# Patient Record
Sex: FEMALE | Race: WHITE | Employment: FULL TIME | URBAN - METROPOLITAN AREA
[De-identification: names, ages, dates, MRNs, and addresses within clinical notes are randomized per-mention and may not be internally consistent; named-entity substitution may affect disease eponyms.]

---

## 2017-01-15 ENCOUNTER — PATIENT MESSAGE (OUTPATIENT)
Dept: FAMILY MEDICINE CLINIC | Facility: CLINIC | Age: 62
End: 2017-01-15

## 2017-01-16 RX ORDER — ALENDRONATE SODIUM 70 MG/1
TABLET ORAL
Qty: 12 TABLET | Refills: 1 | Status: SHIPPED | OUTPATIENT
Start: 2017-01-16 | End: 2017-06-17

## 2017-01-16 RX ORDER — ALENDRONATE SODIUM 70 MG/1
TABLET ORAL
Qty: 12 TABLET | Refills: 1 | Status: SHIPPED | OUTPATIENT
Start: 2017-01-16 | End: 2017-01-16

## 2017-01-16 NOTE — TELEPHONE ENCOUNTER
From: Hilario Huffman  To: Zamzam Remy MD  Sent: 1/15/2017 8:39 AM CST  Subject: Prescription Question    Hello,     I am requesting a refill for Alenendronate tabs 70 mg.      Thank you,    Melissa Tovar

## 2017-02-21 RX ORDER — FLUTICASONE PROPIONATE 50 MCG
SPRAY, SUSPENSION (ML) NASAL
Qty: 48 G | Refills: 0 | Status: SHIPPED | OUTPATIENT
Start: 2017-02-21 | End: 2018-12-04

## 2017-03-11 ENCOUNTER — OFFICE VISIT (OUTPATIENT)
Dept: FAMILY MEDICINE CLINIC | Facility: CLINIC | Age: 62
End: 2017-03-11

## 2017-03-11 VITALS
HEART RATE: 79 BPM | DIASTOLIC BLOOD PRESSURE: 58 MMHG | SYSTOLIC BLOOD PRESSURE: 110 MMHG | WEIGHT: 116 LBS | BODY MASS INDEX: 20 KG/M2 | OXYGEN SATURATION: 98 % | TEMPERATURE: 98 F | RESPIRATION RATE: 16 BRPM

## 2017-03-11 DIAGNOSIS — H66.92 LEFT ACUTE OTITIS MEDIA: Primary | ICD-10-CM

## 2017-03-11 PROCEDURE — 99213 OFFICE O/P EST LOW 20 MIN: CPT | Performed by: NURSE PRACTITIONER

## 2017-03-11 RX ORDER — CEFDINIR 300 MG/1
300 CAPSULE ORAL 2 TIMES DAILY
Qty: 20 CAPSULE | Refills: 0 | Status: SHIPPED | OUTPATIENT
Start: 2017-03-11 | End: 2017-03-21

## 2017-03-11 NOTE — PROGRESS NOTES
HPI:   Lizz Gonsalves is a 64year old female who presents with ill symptoms for  6  days. Patient reports congestion, low grade fever, ear pain, sinus pain, OTC cold meds have not been helping. Has tried increasing fluids with not much relief.        Cu Hypertension Mother         Smoking Status: Former Smoker                   Packs/Day: 1.00  Years: 27        Quit date: 11/20/2007    Smokeless Status: Never Used                        Alcohol Use: Yes                Comment: occasionally        REVIEW O swelling as well. · Hand washing-use hand  or wash hands frequently, cover your cough or sneeze, do not share towels or drinks with others. · May use warm pack to ear or face for comfort.  May use Tylenol or Ibuprofen over the counter for pain/c

## 2017-06-19 RX ORDER — ALENDRONATE SODIUM 70 MG/1
TABLET ORAL
Qty: 12 TABLET | Refills: 1 | Status: SHIPPED | OUTPATIENT
Start: 2017-06-19 | End: 2017-11-20

## 2017-10-07 NOTE — PATIENT INSTRUCTIONS
·  PLAN: Cefdinir, take as directed. Finish all the medication even if you feel better. · Probiotics or yogurt daily during antibiotic use will help decrease stomach upset and restore good bacteria to the gut.   · Please start Flonase 1 spray each nostril
right

## 2017-11-21 NOTE — TELEPHONE ENCOUNTER
Medication(s) to Refill:   Pending Prescriptions Disp Refills    ALENDRONATE SODIUM 70 MG Oral Tab [Pharmacy Med Name: ALENDRONATE  70MG  TAB] 12 tablet      Sig: TAKE 1 TABLET BY MOUTH  EVERY WEEK           Last Time Medication was Filled:  6/19/2017

## 2017-11-22 RX ORDER — ALENDRONATE SODIUM 70 MG/1
TABLET ORAL
Qty: 12 TABLET | Refills: 0 | Status: SHIPPED | OUTPATIENT
Start: 2017-11-22 | End: 2018-02-04

## 2017-11-22 NOTE — TELEPHONE ENCOUNTER
Called pt to schedule physical and inform her of need for DEXA. Pt has appt scheduled with Dr. Brice Baires at [facility] on 12/2/17 to possibly establish care due to conflicting office / work hours.  Pt does no wish to completely change her PCP yet, as she wou

## 2017-12-02 ENCOUNTER — OFFICE VISIT (OUTPATIENT)
Dept: FAMILY MEDICINE CLINIC | Facility: CLINIC | Age: 62
End: 2017-12-02

## 2017-12-02 ENCOUNTER — LAB ENCOUNTER (OUTPATIENT)
Dept: LAB | Age: 62
End: 2017-12-02
Attending: FAMILY MEDICINE
Payer: COMMERCIAL

## 2017-12-02 VITALS
HEART RATE: 71 BPM | RESPIRATION RATE: 16 BRPM | DIASTOLIC BLOOD PRESSURE: 78 MMHG | BODY MASS INDEX: 18.61 KG/M2 | OXYGEN SATURATION: 97 % | WEIGHT: 109 LBS | HEIGHT: 64 IN | SYSTOLIC BLOOD PRESSURE: 108 MMHG

## 2017-12-02 DIAGNOSIS — Z00.00 ROUTINE GENERAL MEDICAL EXAMINATION AT A HEALTH CARE FACILITY: ICD-10-CM

## 2017-12-02 DIAGNOSIS — M85.80 OSTEOPENIA, UNSPECIFIED LOCATION: ICD-10-CM

## 2017-12-02 DIAGNOSIS — Z87.891 HISTORY OF SMOKING 30 OR MORE PACK YEARS: ICD-10-CM

## 2017-12-02 DIAGNOSIS — Z13.820 OSTEOPOROSIS SCREENING: ICD-10-CM

## 2017-12-02 DIAGNOSIS — Z12.2 ENCOUNTER FOR SCREENING FOR LUNG CANCER: ICD-10-CM

## 2017-12-02 DIAGNOSIS — Z12.39 BREAST CANCER SCREENING: Primary | ICD-10-CM

## 2017-12-02 PROCEDURE — 82306 VITAMIN D 25 HYDROXY: CPT | Performed by: FAMILY MEDICINE

## 2017-12-02 PROCEDURE — 80061 LIPID PANEL: CPT | Performed by: FAMILY MEDICINE

## 2017-12-02 PROCEDURE — 99396 PREV VISIT EST AGE 40-64: CPT | Performed by: FAMILY MEDICINE

## 2017-12-02 PROCEDURE — 80050 GENERAL HEALTH PANEL: CPT | Performed by: FAMILY MEDICINE

## 2017-12-02 PROCEDURE — 36415 COLL VENOUS BLD VENIPUNCTURE: CPT | Performed by: FAMILY MEDICINE

## 2017-12-02 NOTE — PATIENT INSTRUCTIONS
-- go to lab for bloodwork  -- call insurance about low dose CT scan for lung cancer screening coverage and shingles  -- schedule CT scan, dexa and mammogram  -- we will call results  -- followup as needed

## 2017-12-04 NOTE — PROGRESS NOTES
Anita Mcclain is a 58year old female who is here for Patient presents with:   Well Adult: annual  Other: needs mammogram order and dexa order      HPI:     Here for physical    Screening:  Diet: tries to eat healthy  Exercise: stays active  Sleep: no lb  03/11/17 : 116 lb  12/30/16 : 116 lb  11/26/16 : 112 lb  11/11/15 : 113 lb  06/15/15 : 108 lb      No Known Allergies    Family History   Problem Relation Age of Onset   • Heart Disorder Brother    • Cancer Mother    • Heart Disorder Father    • Heart bilateraly, no c/w/r  CARDIO: RRR without murmurs  GI: soft, non-tender, non-distended, no hsm, bs throughout  NEURO: CN II-XII grossly intact  PSYCH: pleasant  EXTREMITIES: no cyanosis, clubbing or edema  SKIN: no rashes,no suspicious lesions    The ASCVD

## 2017-12-09 ENCOUNTER — HOSPITAL ENCOUNTER (OUTPATIENT)
Dept: CT IMAGING | Facility: HOSPITAL | Age: 62
Discharge: HOME | End: 2017-12-09
Attending: FAMILY MEDICINE
Payer: COMMERCIAL

## 2017-12-09 ENCOUNTER — HOSPITAL ENCOUNTER (OUTPATIENT)
Dept: BONE DENSITY | Age: 62
Discharge: HOME | End: 2017-12-09
Attending: FAMILY MEDICINE
Payer: COMMERCIAL

## 2017-12-09 ENCOUNTER — HOSPITAL ENCOUNTER (OUTPATIENT)
Dept: MAMMOGRAPHY | Age: 62
Discharge: HOME | End: 2017-12-09
Attending: FAMILY MEDICINE
Payer: COMMERCIAL

## 2017-12-09 DIAGNOSIS — Z12.39 BREAST CANCER SCREENING: ICD-10-CM

## 2017-12-09 DIAGNOSIS — Z12.2 ENCOUNTER FOR SCREENING FOR LUNG CANCER: ICD-10-CM

## 2017-12-09 DIAGNOSIS — Z13.820 OSTEOPOROSIS SCREENING: ICD-10-CM

## 2017-12-09 DIAGNOSIS — Z87.891 HISTORY OF SMOKING 30 OR MORE PACK YEARS: ICD-10-CM

## 2017-12-09 DIAGNOSIS — M85.80 OSTEOPENIA, UNSPECIFIED LOCATION: ICD-10-CM

## 2017-12-09 PROCEDURE — 77080 DXA BONE DENSITY AXIAL: CPT | Performed by: FAMILY MEDICINE

## 2017-12-09 PROCEDURE — 77067 SCR MAMMO BI INCL CAD: CPT | Performed by: FAMILY MEDICINE

## 2017-12-14 NOTE — PROGRESS NOTES
Normal labs except  Vit D low - Vit D 50,000 units weekly x 12 wks, then recheck Vit D upon completion  -followup as planned

## 2017-12-15 ENCOUNTER — TELEPHONE (OUTPATIENT)
Dept: FAMILY MEDICINE CLINIC | Facility: CLINIC | Age: 62
End: 2017-12-15

## 2017-12-15 DIAGNOSIS — E55.9 VITAMIN D DEFICIENCY: Primary | ICD-10-CM

## 2017-12-15 RX ORDER — ERGOCALCIFEROL 1.25 MG/1
CAPSULE ORAL
Qty: 13 CAPSULE | Refills: 0 | OUTPATIENT
Start: 2017-12-15

## 2017-12-15 RX ORDER — ERGOCALCIFEROL 1.25 MG/1
50000 CAPSULE ORAL WEEKLY
Qty: 12 CAPSULE | Refills: 0 | Status: SHIPPED | OUTPATIENT
Start: 2017-12-15 | End: 2018-03-15

## 2017-12-15 NOTE — TELEPHONE ENCOUNTER
----- Message from Serena Gomes MD sent at 12/14/2017  3:35 PM CST -----  Normal labs except  Vit D low - Vit D 50,000 units weekly x 12 wks, then recheck Vit D upon completion  -followup as planned

## 2017-12-15 NOTE — TELEPHONE ENCOUNTER
The patient was informed of her test results as well as Dr. Justine Lai recommendations. Vitamin D prescription was electronically sent to the patient's preferred pharmacy and future vitamin D lab was ordered.  Patient verbalized understanding and has no furthe

## 2018-01-25 ENCOUNTER — APPOINTMENT (OUTPATIENT)
Dept: GENERAL RADIOLOGY | Age: 63
End: 2018-01-25
Payer: COMMERCIAL

## 2018-01-25 ENCOUNTER — OFFICE VISIT (OUTPATIENT)
Dept: FAMILY MEDICINE CLINIC | Facility: CLINIC | Age: 63
End: 2018-01-25

## 2018-01-25 ENCOUNTER — HOSPITAL ENCOUNTER (EMERGENCY)
Age: 63
Discharge: HOME | End: 2018-01-25
Attending: EMERGENCY MEDICINE
Payer: COMMERCIAL

## 2018-01-25 VITALS
TEMPERATURE: 98 F | BODY MASS INDEX: 19 KG/M2 | WEIGHT: 108 LBS | SYSTOLIC BLOOD PRESSURE: 110 MMHG | DIASTOLIC BLOOD PRESSURE: 60 MMHG | OXYGEN SATURATION: 98 % | HEART RATE: 89 BPM | RESPIRATION RATE: 18 BRPM

## 2018-01-25 VITALS
SYSTOLIC BLOOD PRESSURE: 137 MMHG | HEART RATE: 81 BPM | OXYGEN SATURATION: 96 % | HEIGHT: 63 IN | BODY MASS INDEX: 19.14 KG/M2 | DIASTOLIC BLOOD PRESSURE: 79 MMHG | RESPIRATION RATE: 14 BRPM | WEIGHT: 108 LBS | TEMPERATURE: 99 F

## 2018-01-25 DIAGNOSIS — Z02.9 ENCOUNTERS FOR ADMINISTRATIVE PURPOSES: Primary | ICD-10-CM

## 2018-01-25 DIAGNOSIS — R07.89 ACUTE CHEST WALL PAIN: Primary | ICD-10-CM

## 2018-01-25 DIAGNOSIS — J11.1 INFLUENZA: ICD-10-CM

## 2018-01-25 DIAGNOSIS — J98.01 ACUTE BRONCHOSPASM: ICD-10-CM

## 2018-01-25 LAB
ALBUMIN SERPL-MCNC: 3.5 G/DL (ref 3.5–4.8)
ALP LIVER SERPL-CCNC: 54 U/L (ref 50–130)
ALT SERPL-CCNC: 19 U/L (ref 14–54)
AST SERPL-CCNC: 22 U/L (ref 15–41)
ATRIAL RATE: 90 BPM
BASOPHILS # BLD AUTO: 0.02 X10(3) UL (ref 0–0.1)
BASOPHILS NFR BLD AUTO: 0.4 %
BILIRUB SERPL-MCNC: 0.4 MG/DL (ref 0.1–2)
BUN BLD-MCNC: 17 MG/DL (ref 8–20)
CALCIUM BLD-MCNC: 8.2 MG/DL (ref 8.3–10.3)
CHLORIDE: 104 MMOL/L (ref 101–111)
CO2: 27 MMOL/L (ref 22–32)
CREAT BLD-MCNC: 0.74 MG/DL (ref 0.55–1.02)
EOSINOPHIL # BLD AUTO: 0.03 X10(3) UL (ref 0–0.3)
EOSINOPHIL NFR BLD AUTO: 0.6 %
ERYTHROCYTE [DISTWIDTH] IN BLOOD BY AUTOMATED COUNT: 12.5 % (ref 11.5–16)
GLUCOSE BLD-MCNC: 86 MG/DL (ref 70–99)
HCT VFR BLD AUTO: 39.5 % (ref 34–50)
HGB BLD-MCNC: 13.1 G/DL (ref 12–16)
IMMATURE GRANULOCYTE COUNT: 0.01 X10(3) UL (ref 0–1)
IMMATURE GRANULOCYTE RATIO %: 0.2 %
LYMPHOCYTES # BLD AUTO: 0.7 X10(3) UL (ref 0.9–4)
LYMPHOCYTES NFR BLD AUTO: 13.5 %
M PROTEIN MFR SERPL ELPH: 7.2 G/DL (ref 6.1–8.3)
MCH RBC QN AUTO: 30.6 PG (ref 27–33.2)
MCHC RBC AUTO-ENTMCNC: 33.2 G/DL (ref 31–37)
MCV RBC AUTO: 92.3 FL (ref 81–100)
MONOCYTES # BLD AUTO: 0.75 X10(3) UL (ref 0.1–0.6)
MONOCYTES NFR BLD AUTO: 14.5 %
NEUTROPHIL ABS PRELIM: 3.68 X10 (3) UL (ref 1.3–6.7)
NEUTROPHILS # BLD AUTO: 3.68 X10(3) UL (ref 1.3–6.7)
NEUTROPHILS NFR BLD AUTO: 70.8 %
P AXIS: 83 DEGREES
P-R INTERVAL: 148 MS
PLATELET # BLD AUTO: 163 10(3)UL (ref 150–450)
POTASSIUM SERPL-SCNC: 3.4 MMOL/L (ref 3.6–5.1)
Q-T INTERVAL: 370 MS
QRS DURATION: 82 MS
QTC CALCULATION (BEZET): 452 MS
R AXIS: 79 DEGREES
RBC # BLD AUTO: 4.28 X10(6)UL (ref 3.8–5.1)
RED CELL DISTRIBUTION WIDTH-SD: 42.4 FL (ref 35.1–46.3)
SODIUM SERPL-SCNC: 137 MMOL/L (ref 136–144)
T AXIS: 58 DEGREES
TROPONIN: <0.046 NG/ML (ref ?–0.05)
VENTRICULAR RATE: 90 BPM
WBC # BLD AUTO: 5.2 X10(3) UL (ref 4–13)

## 2018-01-25 PROCEDURE — 94640 AIRWAY INHALATION TREATMENT: CPT

## 2018-01-25 PROCEDURE — 84484 ASSAY OF TROPONIN QUANT: CPT

## 2018-01-25 PROCEDURE — 80053 COMPREHEN METABOLIC PANEL: CPT

## 2018-01-25 PROCEDURE — 99285 EMERGENCY DEPT VISIT HI MDM: CPT

## 2018-01-25 PROCEDURE — 93005 ELECTROCARDIOGRAM TRACING: CPT

## 2018-01-25 PROCEDURE — 93010 ELECTROCARDIOGRAM REPORT: CPT

## 2018-01-25 PROCEDURE — 94664 DEMO&/EVAL PT USE INHALER: CPT

## 2018-01-25 PROCEDURE — 96374 THER/PROPH/DIAG INJ IV PUSH: CPT

## 2018-01-25 PROCEDURE — 84484 ASSAY OF TROPONIN QUANT: CPT | Performed by: EMERGENCY MEDICINE

## 2018-01-25 PROCEDURE — 85025 COMPLETE CBC W/AUTO DIFF WBC: CPT | Performed by: EMERGENCY MEDICINE

## 2018-01-25 PROCEDURE — 85025 COMPLETE CBC W/AUTO DIFF WBC: CPT

## 2018-01-25 PROCEDURE — 71046 X-RAY EXAM CHEST 2 VIEWS: CPT

## 2018-01-25 PROCEDURE — 80053 COMPREHEN METABOLIC PANEL: CPT | Performed by: EMERGENCY MEDICINE

## 2018-01-25 RX ORDER — OSELTAMIVIR PHOSPHATE 75 MG/1
75 CAPSULE ORAL 2 TIMES DAILY
Qty: 10 CAPSULE | Refills: 0 | Status: SHIPPED | OUTPATIENT
Start: 2018-01-25 | End: 2018-01-30

## 2018-01-25 RX ORDER — NAPROXEN 500 MG/1
500 TABLET ORAL 2 TIMES DAILY PRN
Qty: 20 TABLET | Refills: 0 | Status: SHIPPED | OUTPATIENT
Start: 2018-01-25 | End: 2019-12-11

## 2018-01-25 RX ORDER — PREDNISONE 20 MG/1
20 TABLET ORAL 2 TIMES DAILY
Qty: 10 TABLET | Refills: 0 | Status: SHIPPED | OUTPATIENT
Start: 2018-01-25 | End: 2018-01-30

## 2018-01-25 RX ORDER — KETOROLAC TROMETHAMINE 30 MG/ML
15 INJECTION, SOLUTION INTRAMUSCULAR; INTRAVENOUS ONCE
Status: COMPLETED | OUTPATIENT
Start: 2018-01-25 | End: 2018-01-25

## 2018-01-25 RX ORDER — PREDNISONE 20 MG/1
40 TABLET ORAL ONCE
Status: COMPLETED | OUTPATIENT
Start: 2018-01-25 | End: 2018-01-25

## 2018-01-25 RX ORDER — ALBUTEROL SULFATE 90 UG/1
2 AEROSOL, METERED RESPIRATORY (INHALATION) EVERY 4 HOURS PRN
Qty: 1 INHALER | Refills: 0 | Status: SHIPPED | OUTPATIENT
Start: 2018-01-25 | End: 2018-02-24

## 2018-01-25 RX ORDER — IPRATROPIUM BROMIDE AND ALBUTEROL SULFATE 2.5; .5 MG/3ML; MG/3ML
3 SOLUTION RESPIRATORY (INHALATION) ONCE
Status: COMPLETED | OUTPATIENT
Start: 2018-01-25 | End: 2018-01-25

## 2018-01-25 NOTE — ED PROVIDER NOTES
Patient Seen in: [facility] Emergency Department In [place]    History   Patient presents with:  Chest Pain Angina (cardiovascular)  Dyspnea MARY SOB (respiratory)    Stated Complaint: chest pain onset last night into back, cough since Monday    HPI    62- SURGICAL HISTORY      Comment: left carpal tunnel  1996: OTHER SURGICAL HISTORY      Comment: ganglion cyst right wrist  2003: OTHER SURGICAL HISTORY      Comment: lithotrypsy  No date: OTHER SURGICAL HISTORY      Comment: vein surgery   2016: OTHER SURGIC Abdominal: Soft. Bowel sounds are normal. There is no tenderness. There is no rebound. Musculoskeletal: Normal range of motion. She exhibits no edema or tenderness. Lymphadenopathy:     She has no cervical adenopathy.    Neurological: She is alert and She was given a DuoNeb and this seemed to improve her breathing and her cough. She still has some chest wall pain that she states is worse when she coughs and moves certain ways. She was given a dose of Toradol and was also given a limited prednisone.   I

## 2018-01-25 NOTE — ED INITIAL ASSESSMENT (HPI)
Cough and fever started Monday. Last night started to have constant chest pain-sharp that radiates to the back. No SOB. Increased pain on movement and coughing.

## 2018-01-25 NOTE — PROGRESS NOTES
S:  Patient reports to clinic for flu like sxs over the past few days with cough and tmax of 101. Patient most concerned about her chest pain that radiates to her back. Chest pain was 10/10 last night. Today chest pain is more of a dull ache.   Chest devon

## 2018-02-05 NOTE — TELEPHONE ENCOUNTER
Medication(s) to Refill:   Pending Prescriptions Disp Refills    ALENDRONATE SODIUM 70 MG Oral Tab [Pharmacy Med Name: ALENDRONATE  70MG  TAB] 12 tablet      Sig: TAKE 1 TABLET BY MOUTH  EVERY WEEK       NEW PCP

## 2018-02-06 RX ORDER — ALENDRONATE SODIUM 70 MG/1
TABLET ORAL
Qty: 12 TABLET | Refills: 1 | Status: SHIPPED | OUTPATIENT
Start: 2018-02-06 | End: 2018-07-18

## 2018-03-15 ENCOUNTER — LAB ENCOUNTER (OUTPATIENT)
Dept: LAB | Age: 63
End: 2018-03-15
Attending: FAMILY MEDICINE
Payer: COMMERCIAL

## 2018-03-15 DIAGNOSIS — E55.9 VITAMIN D DEFICIENCY: ICD-10-CM

## 2018-03-15 LAB — 25-HYDROXYVITAMIN D (TOTAL): 38.8 NG/ML (ref 30–100)

## 2018-03-15 PROCEDURE — 36415 COLL VENOUS BLD VENIPUNCTURE: CPT | Performed by: FAMILY MEDICINE

## 2018-03-15 PROCEDURE — 82306 VITAMIN D 25 HYDROXY: CPT | Performed by: FAMILY MEDICINE

## 2018-05-16 ENCOUNTER — OFFICE VISIT (OUTPATIENT)
Dept: FAMILY MEDICINE CLINIC | Facility: CLINIC | Age: 63
End: 2018-05-16

## 2018-05-16 VITALS
BODY MASS INDEX: 18.67 KG/M2 | DIASTOLIC BLOOD PRESSURE: 68 MMHG | TEMPERATURE: 99 F | SYSTOLIC BLOOD PRESSURE: 112 MMHG | HEART RATE: 82 BPM | WEIGHT: 108 LBS | HEIGHT: 63.78 IN

## 2018-05-16 DIAGNOSIS — R93.89 ABNORMAL CHEST X-RAY: ICD-10-CM

## 2018-05-16 DIAGNOSIS — R05.9 COUGH: Primary | ICD-10-CM

## 2018-05-16 PROCEDURE — 99214 OFFICE O/P EST MOD 30 MIN: CPT | Performed by: FAMILY MEDICINE

## 2018-05-16 NOTE — PROGRESS NOTES
CC:  Lizz Gonsalves is a 58year old female here for Patient presents with:  Cough: on & off since April   Test Results: Lung ct      HPI:     URI  -started 4 wks ago with bronchitis  -associated with cough, congestion  -previous treatment: albuterol fr date: OTHER SURGICAL HISTORY      Comment: bilateral foot sx  2009: OTHER SURGICAL HISTORY      Comment: left carpal tunnel  1996: OTHER SURGICAL HISTORY      Comment: ganglion cyst right wrist  2003: OTHER SURGICAL HISTORY      Comment: lithotrypsy  No da

## 2018-05-16 NOTE — PATIENT INSTRUCTIONS
-- conitnue to monitor cough - as long as continues to be better, nothing to worry about  -- call insurance about coverage for Shingrix vaccine - if covered and interested, can call to make nursing visit  -- followup in 6 months, sooner if needed

## 2018-07-17 ENCOUNTER — HOSPITAL ENCOUNTER (EMERGENCY)
Facility: HOSPITAL | Age: 63
Discharge: HOME | End: 2018-07-17
Attending: EMERGENCY MEDICINE
Payer: COMMERCIAL

## 2018-07-17 ENCOUNTER — APPOINTMENT (OUTPATIENT)
Dept: CT IMAGING | Facility: HOSPITAL | Age: 63
End: 2018-07-17
Attending: EMERGENCY MEDICINE
Payer: COMMERCIAL

## 2018-07-17 VITALS
TEMPERATURE: 97 F | WEIGHT: 109 LBS | OXYGEN SATURATION: 100 % | SYSTOLIC BLOOD PRESSURE: 125 MMHG | DIASTOLIC BLOOD PRESSURE: 92 MMHG | HEART RATE: 62 BPM | HEIGHT: 64 IN | RESPIRATION RATE: 18 BRPM | BODY MASS INDEX: 18.61 KG/M2

## 2018-07-17 DIAGNOSIS — R42 DIZZINESS: Primary | ICD-10-CM

## 2018-07-17 LAB
ALBUMIN SERPL-MCNC: 4 G/DL (ref 3.5–4.8)
ALP LIVER SERPL-CCNC: 58 U/L (ref 50–130)
ALT SERPL-CCNC: 24 U/L (ref 14–54)
APTT PPP: 24.9 SECONDS (ref 26.1–34.6)
AST SERPL-CCNC: 24 U/L (ref 15–41)
ATRIAL RATE: 63 BPM
BASOPHILS # BLD AUTO: 0.02 X10(3) UL (ref 0–0.1)
BASOPHILS NFR BLD AUTO: 0.4 %
BILIRUB SERPL-MCNC: 0.5 MG/DL (ref 0.1–2)
BILIRUB UR QL STRIP.AUTO: NEGATIVE
BUN BLD-MCNC: 21 MG/DL (ref 8–20)
CALCIUM BLD-MCNC: 9.1 MG/DL (ref 8.3–10.3)
CHLORIDE: 105 MMOL/L (ref 101–111)
CLARITY UR REFRACT.AUTO: CLEAR
CO2: 27 MMOL/L (ref 22–32)
COLOR UR AUTO: COLORLESS
CREAT BLD-MCNC: 0.81 MG/DL (ref 0.55–1.02)
EOSINOPHIL # BLD AUTO: 0.07 X10(3) UL (ref 0–0.3)
EOSINOPHIL NFR BLD AUTO: 1.4 %
ERYTHROCYTE [DISTWIDTH] IN BLOOD BY AUTOMATED COUNT: 13 % (ref 11.5–16)
GLUCOSE BLD-MCNC: 96 MG/DL (ref 70–99)
GLUCOSE UR STRIP.AUTO-MCNC: NEGATIVE MG/DL
HCT VFR BLD AUTO: 42.9 % (ref 34–50)
HGB BLD-MCNC: 14.1 G/DL (ref 12–16)
IMMATURE GRANULOCYTE COUNT: 0 X10(3) UL (ref 0–1)
IMMATURE GRANULOCYTE RATIO %: 0 %
INR BLD: 0.96 (ref 0.9–1.1)
KETONES UR STRIP.AUTO-MCNC: NEGATIVE MG/DL
LEUKOCYTE ESTERASE UR QL STRIP.AUTO: NEGATIVE
LYMPHOCYTES # BLD AUTO: 1.04 X10(3) UL (ref 0.9–4)
LYMPHOCYTES NFR BLD AUTO: 20.3 %
M PROTEIN MFR SERPL ELPH: 7.6 G/DL (ref 6.1–8.3)
MCH RBC QN AUTO: 30.2 PG (ref 27–33.2)
MCHC RBC AUTO-ENTMCNC: 32.9 G/DL (ref 31–37)
MCV RBC AUTO: 91.9 FL (ref 81–100)
MONOCYTES # BLD AUTO: 0.43 X10(3) UL (ref 0.1–1)
MONOCYTES NFR BLD AUTO: 8.4 %
NEUTROPHIL ABS PRELIM: 3.57 X10 (3) UL (ref 1.3–6.7)
NEUTROPHILS # BLD AUTO: 3.57 X10(3) UL (ref 1.3–6.7)
NEUTROPHILS NFR BLD AUTO: 69.5 %
NITRITE UR QL STRIP.AUTO: NEGATIVE
P AXIS: 84 DEGREES
P-R INTERVAL: 148 MS
PH UR STRIP.AUTO: 5 [PH] (ref 4.5–8)
PLATELET # BLD AUTO: 210 10(3)UL (ref 150–450)
POTASSIUM SERPL-SCNC: 3.6 MMOL/L (ref 3.6–5.1)
PROT UR STRIP.AUTO-MCNC: NEGATIVE MG/DL
PSA SERPL DL<=0.01 NG/ML-MCNC: 13.2 SECONDS (ref 12.4–14.7)
Q-T INTERVAL: 430 MS
QRS DURATION: 82 MS
QTC CALCULATION (BEZET): 440 MS
R AXIS: 60 DEGREES
RBC # BLD AUTO: 4.67 X10(6)UL (ref 3.8–5.1)
RED CELL DISTRIBUTION WIDTH-SD: 43.6 FL (ref 35.1–46.3)
SODIUM SERPL-SCNC: 140 MMOL/L (ref 136–144)
SP GR UR STRIP.AUTO: <1.005 (ref 1–1.03)
T AXIS: 48 DEGREES
TROPONIN: <0.046 NG/ML (ref ?–0.05)
UROBILINOGEN UR STRIP.AUTO-MCNC: <2 MG/DL
VENTRICULAR RATE: 63 BPM
WBC # BLD AUTO: 5.1 X10(3) UL (ref 4–13)

## 2018-07-17 PROCEDURE — 81001 URINALYSIS AUTO W/SCOPE: CPT | Performed by: EMERGENCY MEDICINE

## 2018-07-17 PROCEDURE — 85025 COMPLETE CBC W/AUTO DIFF WBC: CPT | Performed by: EMERGENCY MEDICINE

## 2018-07-17 PROCEDURE — 85610 PROTHROMBIN TIME: CPT | Performed by: EMERGENCY MEDICINE

## 2018-07-17 PROCEDURE — 85730 THROMBOPLASTIN TIME PARTIAL: CPT | Performed by: EMERGENCY MEDICINE

## 2018-07-17 PROCEDURE — 99285 EMERGENCY DEPT VISIT HI MDM: CPT

## 2018-07-17 PROCEDURE — 70450 CT HEAD/BRAIN W/O DYE: CPT | Performed by: EMERGENCY MEDICINE

## 2018-07-17 PROCEDURE — 80053 COMPREHEN METABOLIC PANEL: CPT | Performed by: EMERGENCY MEDICINE

## 2018-07-17 PROCEDURE — 93010 ELECTROCARDIOGRAM REPORT: CPT

## 2018-07-17 PROCEDURE — 36415 COLL VENOUS BLD VENIPUNCTURE: CPT

## 2018-07-17 PROCEDURE — 84484 ASSAY OF TROPONIN QUANT: CPT | Performed by: EMERGENCY MEDICINE

## 2018-07-17 PROCEDURE — 93005 ELECTROCARDIOGRAM TRACING: CPT

## 2018-07-17 NOTE — ED PROVIDER NOTES
Patient Seen in: [facility] Emergency Department    History   Patient presents with:  Dizziness (neurologic)    Stated Complaint: dizzy, jaw pain     HPI    Shahid is a 41-year-old female, with complaints of dizziness.   She has been noticing for t Yes              Comment: occasionally      Review of Systems    Positive for stated complaint: dizzy, jaw pain   Other systems are as noted in HPI. Constitutional and vital signs reviewed.       All other systems reviewed and negative except as noted abov Normal   CBC WITH DIFFERENTIAL WITH PLATELET    Narrative: The following orders were created for panel order CBC WITH DIFFERENTIAL WITH PLATELET.   Procedure                               Abnormality         Status                     ---------

## 2018-07-17 NOTE — ED INITIAL ASSESSMENT (HPI)
Patient has had Rt jaw and neck pain for couple days. Today is dizzy and shaky.  States she might have a dental issue on Rt side

## 2018-07-18 ENCOUNTER — OFFICE VISIT (OUTPATIENT)
Dept: FAMILY MEDICINE CLINIC | Facility: CLINIC | Age: 63
End: 2018-07-18
Payer: COMMERCIAL

## 2018-07-18 VITALS
DIASTOLIC BLOOD PRESSURE: 82 MMHG | WEIGHT: 111 LBS | HEIGHT: 63.78 IN | HEART RATE: 70 BPM | SYSTOLIC BLOOD PRESSURE: 114 MMHG | BODY MASS INDEX: 19.19 KG/M2

## 2018-07-18 DIAGNOSIS — R68.84 JAW PAIN: ICD-10-CM

## 2018-07-18 DIAGNOSIS — R42 DIZZINESS: Primary | ICD-10-CM

## 2018-07-18 DIAGNOSIS — R51.9 HEADACHE DISORDER: ICD-10-CM

## 2018-07-18 PROCEDURE — 99214 OFFICE O/P EST MOD 30 MIN: CPT | Performed by: FAMILY MEDICINE

## 2018-07-18 NOTE — PROGRESS NOTES
Crystal Brown is a 58year old female here for Patient presents with:  ER F/U: Right side jaw pain, headache, and dizziness follow-up   Other: The patient states that Memorial day weekend she fell off a ladder       HPI:       1. Dizziness  2.  Headache Father       Social History: Smoking status: Former Smoker                                                              Packs/day: 1.00      Years: 30.00        Quit date: 11/20/2007  Smokeless tobacco: Never Used                      Alcohol use:  Yes with dentist for full oral exam  -f/u here in 1 month, sooner prn          The patient is asked to return in 1 month. Orders This Visit:  No orders of the defined types were placed in this encounter.       Meds This Visit:    No prescriptions requested o

## 2018-07-18 NOTE — PATIENT INSTRUCTIONS
-- stop alendronate  -- start flonase every day  -- start generic claritin or zyrtec daily  -- start exercises for upper back and neck daily  -- heat/ice as needed  -- tylenol or ibuprofen as needed  -- if jaw pain not improving, see dentist  -- followup

## 2018-10-03 ENCOUNTER — OFFICE VISIT (OUTPATIENT)
Dept: FAMILY MEDICINE CLINIC | Facility: CLINIC | Age: 63
End: 2018-10-03
Payer: COMMERCIAL

## 2018-10-03 VITALS
HEART RATE: 68 BPM | BODY MASS INDEX: 19.19 KG/M2 | HEIGHT: 63.78 IN | SYSTOLIC BLOOD PRESSURE: 118 MMHG | DIASTOLIC BLOOD PRESSURE: 62 MMHG | WEIGHT: 111 LBS

## 2018-10-03 DIAGNOSIS — R22.2 LUMP IN CHEST: Primary | ICD-10-CM

## 2018-10-03 PROCEDURE — 99214 OFFICE O/P EST MOD 30 MIN: CPT | Performed by: FAMILY MEDICINE

## 2018-10-03 NOTE — PATIENT INSTRUCTIONS
-- warm compress to area 2-3x/day  ----ibuprofen 400mg-600mg 3x/day with food consistently for 2-3 days, then only as needed for pain (do not use for prolonged period)  -- call to schedule ultrasound  -- followup if not improving or worsening  -- come ba

## 2018-10-03 NOTE — PROGRESS NOTES
Hilario Huffman is a 58year old female here for Patient presents with:  Chest Pressure: Bulging vein & bruising in chest since last week. HPI:       1.  Lump in chest  -started last week  -noticed a bruise on right side of chest   - also looks like since quitting: 10.8      Smokeless tobacco: Never Used    Alcohol use: Yes      Comment: occasionally    Drug use: No       Medications (Active prior to today's visit):    Current Outpatient Medications:  naproxen 500 MG Oral Tab Take 1 tablet (500 mg tot MD

## 2018-10-08 ENCOUNTER — HOSPITAL ENCOUNTER (OUTPATIENT)
Dept: ULTRASOUND IMAGING | Facility: HOSPITAL | Age: 63
Discharge: HOME | End: 2018-10-08
Attending: FAMILY MEDICINE
Payer: COMMERCIAL

## 2018-10-08 DIAGNOSIS — R22.2 LUMP IN CHEST: ICD-10-CM

## 2018-10-08 PROCEDURE — 76604 US EXAM CHEST: CPT | Performed by: FAMILY MEDICINE

## 2018-10-19 ENCOUNTER — TELEPHONE (OUTPATIENT)
Dept: FAMILY MEDICINE CLINIC | Facility: CLINIC | Age: 63
End: 2018-10-19

## 2018-10-19 NOTE — TELEPHONE ENCOUNTER
----- Message from Zeeshan Walton MD sent at 10/18/2018  2:34 PM CDT -----  Could be fistula or aneurysm - please refer to Dr. John Feng for further evaluation (dx: av fistula)

## 2018-12-04 ENCOUNTER — OFFICE VISIT (OUTPATIENT)
Dept: FAMILY MEDICINE CLINIC | Facility: CLINIC | Age: 63
End: 2018-12-04
Payer: COMMERCIAL

## 2018-12-04 VITALS
WEIGHT: 110.13 LBS | DIASTOLIC BLOOD PRESSURE: 78 MMHG | SYSTOLIC BLOOD PRESSURE: 112 MMHG | OXYGEN SATURATION: 98 % | HEIGHT: 63.39 IN | HEART RATE: 79 BPM | BODY MASS INDEX: 19.27 KG/M2 | TEMPERATURE: 98 F

## 2018-12-04 DIAGNOSIS — Z12.39 BREAST CANCER SCREENING: ICD-10-CM

## 2018-12-04 DIAGNOSIS — Z12.2 ENCOUNTER FOR SCREENING FOR LUNG CANCER: ICD-10-CM

## 2018-12-04 DIAGNOSIS — Z12.11 COLON CANCER SCREENING: ICD-10-CM

## 2018-12-04 DIAGNOSIS — Z00.00 ROUTINE GENERAL MEDICAL EXAMINATION AT A HEALTH CARE FACILITY: Primary | ICD-10-CM

## 2018-12-04 PROCEDURE — 99396 PREV VISIT EST AGE 40-64: CPT | Performed by: FAMILY MEDICINE

## 2018-12-04 RX ORDER — FLUTICASONE PROPIONATE 50 MCG
SPRAY, SUSPENSION (ML) NASAL
Qty: 48 G | Refills: 0 | Status: SHIPPED | OUTPATIENT
Start: 2018-12-04 | End: 2018-12-04

## 2018-12-04 RX ORDER — FLUTICASONE PROPIONATE 50 MCG
SPRAY, SUSPENSION (ML) NASAL
Qty: 3 BOTTLE | Refills: 0 | Status: SHIPPED | OUTPATIENT
Start: 2018-12-04

## 2018-12-05 NOTE — PROGRESS NOTES
Alvarez Carranza is a 61year old female who is here for No chief complaint on file.       HPI:     Here for physical    Screening:  Diet: tries to eat healthy  Exercise: stays active  Sleep: normal  Depression/Anxiety: mild anxiety - controllable    Las Disorder Father       Past Medical History:   Diagnosis Date   • Allergic rhinitis 2000   • Calculus of kidney 2005   • Cancer ([address]     basal cell cancer   • Hx of skin cancer, basal cell 2016    on right temple   • Osteopenia    • Visual impairment     g cyanosis, clubbing or edema  SKIN: no rashes,no suspicious lesions    The ASCVD Risk score (Scott Edwards., et al., 2013) failed to calculate for the following reasons:     The valid HDL cholesterol range is 20 to 100 mg/dL    ASSESSMENT AND PLAN:     Health M

## 2018-12-05 NOTE — PATIENT INSTRUCTIONS
-- -- come in for fasting bloodwork anytime that you are able to (8-10h no food, only water; lab here is open M-F, 8am-12)  -- go to [email or web address] to schedule an appointment online  -- we will call with results about 5-7 days after bloodwork is completed

## 2018-12-08 ENCOUNTER — LAB ENCOUNTER (OUTPATIENT)
Dept: LAB | Age: 63
End: 2018-12-08
Attending: FAMILY MEDICINE
Payer: COMMERCIAL

## 2018-12-08 DIAGNOSIS — Z00.00 ROUTINE GENERAL MEDICAL EXAMINATION AT A HEALTH CARE FACILITY: ICD-10-CM

## 2018-12-08 PROCEDURE — 80050 GENERAL HEALTH PANEL: CPT | Performed by: FAMILY MEDICINE

## 2018-12-08 PROCEDURE — 36415 COLL VENOUS BLD VENIPUNCTURE: CPT | Performed by: FAMILY MEDICINE

## 2018-12-08 PROCEDURE — 80061 LIPID PANEL: CPT | Performed by: FAMILY MEDICINE

## 2018-12-17 ENCOUNTER — TELEPHONE (OUTPATIENT)
Dept: FAMILY MEDICINE CLINIC | Facility: CLINIC | Age: 63
End: 2018-12-17

## 2018-12-17 DIAGNOSIS — D72.819 LEUKOPENIA, UNSPECIFIED TYPE: Primary | ICD-10-CM

## 2018-12-17 NOTE — TELEPHONE ENCOUNTER
----- Message from Armando Leon MD sent at 12/17/2018  8:07 AM CST -----  Labs normal except: White blood cell (WBC) count in mildly low.   Please order repeat CBC, dx: leukopenia  -patient notified via [employer]

## 2018-12-31 ENCOUNTER — HOSPITAL ENCOUNTER (OUTPATIENT)
Dept: MAMMOGRAPHY | Age: 63
Discharge: HOME | End: 2018-12-31
Attending: FAMILY MEDICINE
Payer: COMMERCIAL

## 2018-12-31 ENCOUNTER — HOSPITAL ENCOUNTER (OUTPATIENT)
Dept: CT IMAGING | Facility: HOSPITAL | Age: 63
Discharge: HOME | End: 2018-12-31
Attending: FAMILY MEDICINE
Payer: COMMERCIAL

## 2018-12-31 DIAGNOSIS — Z00.00 ROUTINE GENERAL MEDICAL EXAMINATION AT A HEALTH CARE FACILITY: ICD-10-CM

## 2018-12-31 DIAGNOSIS — Z12.39 BREAST CANCER SCREENING: ICD-10-CM

## 2018-12-31 DIAGNOSIS — Z12.2 ENCOUNTER FOR SCREENING FOR LUNG CANCER: ICD-10-CM

## 2018-12-31 PROCEDURE — 77067 SCR MAMMO BI INCL CAD: CPT | Performed by: FAMILY MEDICINE

## 2019-01-07 ENCOUNTER — OFFICE VISIT (OUTPATIENT)
Dept: SURGERY | Facility: CLINIC | Age: 64
End: 2019-01-07
Payer: COMMERCIAL

## 2019-01-07 ENCOUNTER — TELEPHONE (OUTPATIENT)
Dept: FAMILY MEDICINE CLINIC | Facility: CLINIC | Age: 64
End: 2019-01-07

## 2019-01-07 VITALS
HEART RATE: 78 BPM | OXYGEN SATURATION: 99 % | WEIGHT: 108 LBS | DIASTOLIC BLOOD PRESSURE: 98 MMHG | BODY MASS INDEX: 18.44 KG/M2 | SYSTOLIC BLOOD PRESSURE: 141 MMHG | RESPIRATION RATE: 18 BRPM | HEIGHT: 64 IN

## 2019-01-07 DIAGNOSIS — I80.8 MONDOR'S DISEASE: Primary | ICD-10-CM

## 2019-01-07 DIAGNOSIS — Z12.2 ENCOUNTER FOR SCREENING FOR LUNG CANCER: Primary | ICD-10-CM

## 2019-01-07 PROCEDURE — 76642 ULTRASOUND BREAST LIMITED: CPT | Performed by: SURGERY

## 2019-01-07 PROCEDURE — 99203 OFFICE O/P NEW LOW 30 MIN: CPT | Performed by: SURGERY

## 2019-01-07 NOTE — TELEPHONE ENCOUNTER
----- Message from Yadira Jean-Baptiste MD sent at 1/7/2019  7:59 AM CST -----  CT scan is unremarkable - recommendation is to repeat in 1 yr - please reorder for 1 yr from order date

## 2019-01-07 NOTE — TELEPHONE ENCOUNTER
Spoke with patient informed of results. She verbalized understanding and had no further questions or concerns at this time. She will get CT scan of Lung in 1 year.

## 2019-01-14 ENCOUNTER — OFFICE VISIT (OUTPATIENT)
Dept: FAMILY MEDICINE CLINIC | Facility: CLINIC | Age: 64
End: 2019-01-14
Payer: COMMERCIAL

## 2019-01-14 VITALS
WEIGHT: 108 LBS | HEIGHT: 64 IN | TEMPERATURE: 98 F | OXYGEN SATURATION: 98 % | DIASTOLIC BLOOD PRESSURE: 84 MMHG | SYSTOLIC BLOOD PRESSURE: 122 MMHG | BODY MASS INDEX: 18.44 KG/M2 | HEART RATE: 78 BPM | RESPIRATION RATE: 16 BRPM

## 2019-01-14 DIAGNOSIS — H66.001 ACUTE SUPPURATIVE OTITIS MEDIA OF RIGHT EAR WITHOUT SPONTANEOUS RUPTURE OF TYMPANIC MEMBRANE, RECURRENCE NOT SPECIFIED: Primary | ICD-10-CM

## 2019-01-14 DIAGNOSIS — J06.9 UPPER RESPIRATORY TRACT INFECTION, UNSPECIFIED TYPE: ICD-10-CM

## 2019-01-14 PROCEDURE — 99213 OFFICE O/P EST LOW 20 MIN: CPT | Performed by: PHYSICIAN ASSISTANT

## 2019-01-14 RX ORDER — AMOXICILLIN 500 MG/1
500 CAPSULE ORAL 3 TIMES DAILY
Qty: 30 CAPSULE | Refills: 0 | Status: SHIPPED | OUTPATIENT
Start: 2019-01-14 | End: 2019-01-24

## 2019-01-14 RX ORDER — FLUTICASONE PROPIONATE 50 MCG
2 SPRAY, SUSPENSION (ML) NASAL DAILY
Qty: 1 BOTTLE | Refills: 0 | Status: SHIPPED | OUTPATIENT
Start: 2019-01-14 | End: 2019-01-28

## 2019-01-14 NOTE — PROGRESS NOTES
CHIEF COMPLAINT:   Patient presents with:  Runny Nose: 3-4 days, R ear fluttering x today      HPI:   Luis Berman is a 61year old female who presents for upper respiratory symptoms for 4 days.  Patient reports mild nasal congestion, nasal drainage, m • COLONOSCOPY N/A 11/22/2013    Performed by Joshua Zapien MD at [facility] ENDOSCOPY   • ALVARADO NEEDLE LOCALIZATION W/ SPECIMEN 1 SITE LEFT  1992 ?      benign   • OTHER SURGICAL HISTORY      bilateral foot sx   • OTHER SURGICAL HISTORY  2009    left carpal valentina THROAT: Oral mucosa pink, moist. Posterior pharynx is not erythematous. No exudates. Uvula midline. NECK: Supple, non-tender  LUNGS: clear to auscultation bilaterally, no wheezes or rhonchi. Breathing is non labored.   CARDIO: RRR without murmur  EXTREMIT · Finish all of the antibiotic medicine given, even though you may feel better after the first few days. · You may use over-the-counter medicine, such as acetaminophen or ibuprofen, to control pain and fever, unless something else was prescribed.  If you h

## 2019-01-14 NOTE — PATIENT INSTRUCTIONS
1. Amoxicillin  2. OTC Flonase  3. Follow up with PCP    Middle Ear Infection (Adult)  You have an infection of the middle ear, the space behind the eardrum. This is also called acute otitis media (AOM). Sometimes it is caused by the common cold.  This is © 4560-1056 [employer] [address] [facility] [place]. All rights reserved. This information is not intended as a substitute for professional medical care. Always follow your healthcare professional's instructions.

## 2019-02-09 ENCOUNTER — LAB ENCOUNTER (OUTPATIENT)
Dept: LAB | Age: 64
End: 2019-02-09
Attending: FAMILY MEDICINE
Payer: COMMERCIAL

## 2019-02-09 DIAGNOSIS — D72.819 LEUKOPENIA, UNSPECIFIED TYPE: ICD-10-CM

## 2019-02-09 LAB
BASOPHILS # BLD AUTO: 0.02 X10(3) UL (ref 0–0.2)
BASOPHILS NFR BLD AUTO: 0.4 %
DEPRECATED RDW RBC AUTO: 46.7 FL (ref 35.1–46.3)
EOSINOPHIL # BLD AUTO: 0.08 X10(3) UL (ref 0–0.7)
EOSINOPHIL NFR BLD AUTO: 1.8 %
ERYTHROCYTE [DISTWIDTH] IN BLOOD BY AUTOMATED COUNT: 13.4 % (ref 11–15)
HCT VFR BLD AUTO: 40.6 % (ref 35–48)
HGB BLD-MCNC: 12.7 G/DL (ref 12–16)
IMM GRANULOCYTES # BLD AUTO: 0 X10(3) UL (ref 0–1)
IMM GRANULOCYTES NFR BLD: 0 %
LYMPHOCYTES # BLD AUTO: 1.03 X10(3) UL (ref 1–4)
LYMPHOCYTES NFR BLD AUTO: 22.9 %
MCH RBC QN AUTO: 29.4 PG (ref 26–34)
MCHC RBC AUTO-ENTMCNC: 31.3 G/DL (ref 31–37)
MCV RBC AUTO: 94 FL (ref 80–100)
MONOCYTES # BLD AUTO: 0.37 X10(3) UL (ref 0.1–1)
MONOCYTES NFR BLD AUTO: 8.2 %
NEUTROPHILS # BLD AUTO: 2.99 X10 (3) UL (ref 1.5–7.7)
NEUTROPHILS # BLD AUTO: 2.99 X10(3) UL (ref 1.5–7.7)
NEUTROPHILS NFR BLD AUTO: 66.7 %
PLATELET # BLD AUTO: 212 10(3)UL (ref 150–450)
RBC # BLD AUTO: 4.32 X10(6)UL (ref 3.8–5.3)
WBC # BLD AUTO: 4.5 X10(3) UL (ref 4–11)

## 2019-02-09 PROCEDURE — 36415 COLL VENOUS BLD VENIPUNCTURE: CPT | Performed by: FAMILY MEDICINE

## 2019-02-09 PROCEDURE — 85025 COMPLETE CBC W/AUTO DIFF WBC: CPT | Performed by: FAMILY MEDICINE

## 2019-05-02 NOTE — PROGRESS NOTES
Breast Surgery New Patient Consultation    This is the first visit for this 61year old woman, referred by Dr. Piper Salas, who presents for evaluation of right breast concer.     History of Present Illness:   Ms. Tato Cordero is a 61year old woman who pre OTHER SURGICAL HISTORY      bilateral foot sx   • OTHER SURGICAL HISTORY  2009    left carpal tunnel   • OTHER SURGICAL HISTORY  1996    ganglion cyst right wrist   • OTHER SURGICAL HISTORY  2003    lithotrypsy   • OTHER SURGICAL HISTORY      vein surgery Never Used   The patient is . She has 1 child. She is employed full-time. Review of Systems:  General:   The patient denies, fever, chills, night sweats, fatigue, generalized weakness, change in appetite or weight loss.     HEENT:     The patie pain or bone pain.     Neuropsychiatric:  There is no history of migraines or severe headaches, seizure/epilepsy, speech problems, coordination problems, trembling/tremors, fainting/black outs, dizziness, memory problems, loss of sensation/numbness, problem areola appear normal. There is no skin dimpling with movement of the pectoralis. There is no nipple retraction. No nipple discharge can be elicited. The parenchyma is mildly nodular.  There are no dominant masses in the breast. The axillary tail is normal. further interrogation and/or intervention is required at this time. Provided her symptoms remain resolved, she will not benefit from any screening until December 2018. She was encouraged to contact my office should her dilation and/or symptoms return.  Sh

## 2019-05-22 ENCOUNTER — MED REC SCAN ONLY (OUTPATIENT)
Dept: FAMILY MEDICINE CLINIC | Facility: CLINIC | Age: 64
End: 2019-05-22

## 2019-08-23 ENCOUNTER — HOSPITAL ENCOUNTER (OUTPATIENT)
Facility: HOSPITAL | Age: 64
Setting detail: HOSPITAL OUTPATIENT SURGERY
Discharge: HOME | End: 2019-08-23
Attending: ORTHOPAEDIC SURGERY | Admitting: ORTHOPAEDIC SURGERY
Payer: OTHER MISCELLANEOUS

## 2019-08-23 VITALS
HEIGHT: 64 IN | RESPIRATION RATE: 16 BRPM | SYSTOLIC BLOOD PRESSURE: 152 MMHG | DIASTOLIC BLOOD PRESSURE: 89 MMHG | OXYGEN SATURATION: 100 % | HEART RATE: 59 BPM | BODY MASS INDEX: 19.23 KG/M2 | TEMPERATURE: 98 F | WEIGHT: 112.63 LBS

## 2019-08-23 PROCEDURE — 01N50ZZ RELEASE MEDIAN NERVE, OPEN APPROACH: ICD-10-PCS | Performed by: ORTHOPAEDIC SURGERY

## 2019-08-23 RX ORDER — HYDROCODONE BITARTRATE AND ACETAMINOPHEN 5; 325 MG/1; MG/1
2 TABLET ORAL AS NEEDED
Status: DISCONTINUED | OUTPATIENT
Start: 2019-08-23 | End: 2019-08-23

## 2019-08-23 RX ORDER — HYDROCODONE BITARTRATE AND ACETAMINOPHEN 5; 325 MG/1; MG/1
1 TABLET ORAL AS NEEDED
Status: DISCONTINUED | OUTPATIENT
Start: 2019-08-23 | End: 2019-08-23

## 2019-08-23 RX ORDER — ACETAMINOPHEN AND CODEINE PHOSPHATE 300; 30 MG/1; MG/1
1-2 TABLET ORAL EVERY 6 HOURS PRN
Qty: 20 TABLET | Refills: 0 | Status: SHIPPED | OUTPATIENT
Start: 2019-08-23 | End: 2019-12-11 | Stop reason: ALTCHOICE

## 2019-08-23 RX ORDER — ONDANSETRON 2 MG/ML
4 INJECTION INTRAMUSCULAR; INTRAVENOUS AS NEEDED
Status: DISCONTINUED | OUTPATIENT
Start: 2019-08-23 | End: 2019-08-23

## 2019-08-23 RX ORDER — SODIUM CHLORIDE, SODIUM LACTATE, POTASSIUM CHLORIDE, CALCIUM CHLORIDE 600; 310; 30; 20 MG/100ML; MG/100ML; MG/100ML; MG/100ML
INJECTION, SOLUTION INTRAVENOUS CONTINUOUS
Status: DISCONTINUED | OUTPATIENT
Start: 2019-08-23 | End: 2019-08-23

## 2019-08-23 RX ORDER — DEXAMETHASONE SODIUM PHOSPHATE 4 MG/ML
4 VIAL (ML) INJECTION AS NEEDED
Status: DISCONTINUED | OUTPATIENT
Start: 2019-08-23 | End: 2019-08-23

## 2019-08-23 RX ORDER — HYDROMORPHONE HYDROCHLORIDE 1 MG/ML
0.4 INJECTION, SOLUTION INTRAMUSCULAR; INTRAVENOUS; SUBCUTANEOUS EVERY 5 MIN PRN
Status: DISCONTINUED | OUTPATIENT
Start: 2019-08-23 | End: 2019-08-23

## 2019-08-23 RX ORDER — CEFAZOLIN SODIUM/WATER 2 G/20 ML
2 SYRINGE (ML) INTRAVENOUS ONCE
Status: COMPLETED | OUTPATIENT
Start: 2019-08-23 | End: 2019-08-23

## 2019-08-23 RX ORDER — NALOXONE HYDROCHLORIDE 0.4 MG/ML
80 INJECTION, SOLUTION INTRAMUSCULAR; INTRAVENOUS; SUBCUTANEOUS AS NEEDED
Status: DISCONTINUED | OUTPATIENT
Start: 2019-08-23 | End: 2019-08-23

## 2019-08-23 RX ORDER — ACETAMINOPHEN 500 MG
1000 TABLET ORAL ONCE
Status: DISCONTINUED | OUTPATIENT
Start: 2019-08-23 | End: 2019-08-23 | Stop reason: HOSPADM

## 2019-08-23 NOTE — BRIEF OP NOTE
Pre-Operative Diagnosis: CARPAL TUNNEL SYNDROME, RIGHT     Post-Operative Diagnosis: CARPAL TUNNEL SYNDROME, RIGHT      Procedure Performed:   Procedure(s):  OPEN CARPAL TUNNEL RELEASE RIGHT    Surgeon(s) and Role:     Rell Pablo MD - [facility]

## 2019-08-23 NOTE — OPERATIVE REPORT
OPERATIVE PROCEDURE                  Patient Name: Vannessa Gonzalez    Preoperative Diagnosis: CARPAL TUNNEL SYNDROME, RIGHT    Postoperative Diagnosis: CARPAL TUNNEL SYNDROME, RIGHT    Primary Surgeon: Rani Fernandes     Assistant: none    Procedures: RIGHT upper extremity extended on a hand table. The patient was then placed in a well-padded proximal double-arm tourniquet preset to 250 mmHg. Anesthesia then administered a regional [place] block without complication.  The operative upper extremity was then carefu in slight extension. An Ace wrap was used for compression. The patient tolerated the procedure well and was taken to the postanesthesia phase II recovery room in stable condition. All sponge, needle, and instrument counts were correct.        Michelle Elias

## 2019-12-11 ENCOUNTER — OFFICE VISIT (OUTPATIENT)
Dept: FAMILY MEDICINE CLINIC | Facility: CLINIC | Age: 64
End: 2019-12-11
Payer: COMMERCIAL

## 2019-12-11 VITALS
DIASTOLIC BLOOD PRESSURE: 82 MMHG | OXYGEN SATURATION: 94 % | SYSTOLIC BLOOD PRESSURE: 128 MMHG | HEART RATE: 80 BPM | BODY MASS INDEX: 20.47 KG/M2 | TEMPERATURE: 98 F | WEIGHT: 117 LBS | HEIGHT: 63.39 IN

## 2019-12-11 DIAGNOSIS — Z12.4 SCREENING FOR MALIGNANT NEOPLASM OF CERVIX: ICD-10-CM

## 2019-12-11 DIAGNOSIS — Z00.00 ROUTINE GENERAL MEDICAL EXAMINATION AT A HEALTH CARE FACILITY: Primary | ICD-10-CM

## 2019-12-11 DIAGNOSIS — Z12.39 BREAST CANCER SCREENING: ICD-10-CM

## 2019-12-11 DIAGNOSIS — Z12.2 ENCOUNTER FOR SCREENING FOR LUNG CANCER: ICD-10-CM

## 2019-12-11 PROCEDURE — 99396 PREV VISIT EST AGE 40-64: CPT | Performed by: FAMILY MEDICINE

## 2019-12-11 RX ORDER — AMOXICILLIN AND CLAVULANATE POTASSIUM 875; 125 MG/1; MG/1
TABLET, FILM COATED ORAL
Refills: 0 | COMMUNITY
Start: 2019-12-07 | End: 2021-01-18 | Stop reason: ALTCHOICE

## 2019-12-12 NOTE — PROGRESS NOTES
Kristina Kunz is a 59year old female who is here for No chief complaint on file.       HPI:     Here for physical    Screening:  Diet: tries to eat healthy  Exercise: stays active  Sleep: normal  Depression/Anxiety: mild anxiety - controllable    Las Encounters:  12/11/19 : 117 lb (53.1 kg)  08/23/19 : 112 lb 10.5 oz (51.1 kg)  01/16/19 : 108 lb (49 kg)  01/14/19 : 108 lb (49 kg)  01/07/19 : 108 lb (49 kg)  12/26/18 : 108 lb (49 kg)      No Known Allergies    Family History   Problem Relation Age of On Years since quittin.0      Smokeless tobacco: Never Used    Alcohol use: Yes      Frequency: 2-3 times a week      Drinks per session: 1 or 2      Comment: 1-2 DRINKS WEEK    Drug use: No          EXAM:   /82 (BP Location: Left arm, Patient Posi

## 2019-12-12 NOTE — PATIENT INSTRUCTIONS
-- come in for fasting bloodwork anytime that you are able to (8-10h no food, only water; lab here is open M-F, 8am-12)  -- go to [email or web address] to schedule an appointment online  -- we will call with results about 5-7 days after bloodwork is completed

## 2020-01-02 ENCOUNTER — LAB ENCOUNTER (OUTPATIENT)
Dept: LAB | Age: 65
End: 2020-01-02
Attending: FAMILY MEDICINE
Payer: COMMERCIAL

## 2020-01-02 ENCOUNTER — HOSPITAL ENCOUNTER (OUTPATIENT)
Dept: CT IMAGING | Facility: HOSPITAL | Age: 65
Discharge: HOME | End: 2020-01-02
Attending: FAMILY MEDICINE
Payer: COMMERCIAL

## 2020-01-02 ENCOUNTER — HOSPITAL ENCOUNTER (OUTPATIENT)
Dept: MAMMOGRAPHY | Age: 65
Discharge: HOME | End: 2020-01-02
Attending: FAMILY MEDICINE
Payer: COMMERCIAL

## 2020-01-02 DIAGNOSIS — Z00.00 ROUTINE GENERAL MEDICAL EXAMINATION AT A HEALTH CARE FACILITY: ICD-10-CM

## 2020-01-02 DIAGNOSIS — Z12.2 ENCOUNTER FOR SCREENING FOR LUNG CANCER: ICD-10-CM

## 2020-01-02 DIAGNOSIS — Z12.39 BREAST CANCER SCREENING: ICD-10-CM

## 2020-01-02 LAB
ALBUMIN SERPL-MCNC: 3.7 G/DL (ref 3.4–5)
ALBUMIN/GLOB SERPL: 1.2 {RATIO} (ref 1–2)
ALP LIVER SERPL-CCNC: 60 U/L (ref 50–130)
ALT SERPL-CCNC: 16 U/L (ref 13–56)
ANION GAP SERPL CALC-SCNC: 4 MMOL/L (ref 0–18)
AST SERPL-CCNC: 18 U/L (ref 15–37)
BASOPHILS # BLD AUTO: 0.02 X10(3) UL (ref 0–0.2)
BASOPHILS NFR BLD AUTO: 0.5 %
BILIRUB SERPL-MCNC: 0.5 MG/DL (ref 0.1–2)
BUN BLD-MCNC: 21 MG/DL (ref 7–18)
BUN/CREAT SERPL: 29.2 (ref 10–20)
CALCIUM BLD-MCNC: 8.9 MG/DL (ref 8.5–10.1)
CHLORIDE SERPL-SCNC: 111 MMOL/L (ref 98–112)
CHOLEST SMN-MCNC: 233 MG/DL (ref ?–200)
CO2 SERPL-SCNC: 28 MMOL/L (ref 21–32)
CREAT BLD-MCNC: 0.72 MG/DL (ref 0.55–1.02)
DEPRECATED RDW RBC AUTO: 43.2 FL (ref 35.1–46.3)
EOSINOPHIL # BLD AUTO: 0.13 X10(3) UL (ref 0–0.7)
EOSINOPHIL NFR BLD AUTO: 2.9 %
ERYTHROCYTE [DISTWIDTH] IN BLOOD BY AUTOMATED COUNT: 12.4 % (ref 11–15)
GLOBULIN PLAS-MCNC: 3.1 G/DL (ref 2.8–4.4)
GLUCOSE BLD-MCNC: 83 MG/DL (ref 70–99)
HCT VFR BLD AUTO: 42.9 % (ref 35–48)
HDLC SERPL-MCNC: 101 MG/DL (ref 40–59)
HGB BLD-MCNC: 13.7 G/DL (ref 12–16)
IMM GRANULOCYTES # BLD AUTO: 0.01 X10(3) UL (ref 0–1)
IMM GRANULOCYTES NFR BLD: 0.2 %
LDLC SERPL CALC-MCNC: 115 MG/DL (ref ?–100)
LYMPHOCYTES # BLD AUTO: 1.16 X10(3) UL (ref 1–4)
LYMPHOCYTES NFR BLD AUTO: 26.3 %
M PROTEIN MFR SERPL ELPH: 6.8 G/DL (ref 6.4–8.2)
MCH RBC QN AUTO: 30.4 PG (ref 26–34)
MCHC RBC AUTO-ENTMCNC: 31.9 G/DL (ref 31–37)
MCV RBC AUTO: 95.1 FL (ref 80–100)
MONOCYTES # BLD AUTO: 0.42 X10(3) UL (ref 0.1–1)
MONOCYTES NFR BLD AUTO: 9.5 %
NEUTROPHILS # BLD AUTO: 2.67 X10 (3) UL (ref 1.5–7.7)
NEUTROPHILS # BLD AUTO: 2.67 X10(3) UL (ref 1.5–7.7)
NEUTROPHILS NFR BLD AUTO: 60.6 %
NONHDLC SERPL-MCNC: 132 MG/DL (ref ?–130)
OSMOLALITY SERPL CALC.SUM OF ELEC: 298 MOSM/KG (ref 275–295)
PATIENT FASTING Y/N/NP: YES
PATIENT FASTING Y/N/NP: YES
PLATELET # BLD AUTO: 199 10(3)UL (ref 150–450)
POTASSIUM SERPL-SCNC: 3.8 MMOL/L (ref 3.5–5.1)
RBC # BLD AUTO: 4.51 X10(6)UL (ref 3.8–5.3)
SODIUM SERPL-SCNC: 143 MMOL/L (ref 136–145)
TRIGL SERPL-MCNC: 87 MG/DL (ref 30–149)
TSI SER-ACNC: 2.55 MIU/ML (ref 0.36–3.74)
VLDLC SERPL CALC-MCNC: 17 MG/DL (ref 0–30)
WBC # BLD AUTO: 4.4 X10(3) UL (ref 4–11)

## 2020-01-02 PROCEDURE — 80053 COMPREHEN METABOLIC PANEL: CPT | Performed by: FAMILY MEDICINE

## 2020-01-02 PROCEDURE — 36415 COLL VENOUS BLD VENIPUNCTURE: CPT | Performed by: FAMILY MEDICINE

## 2020-01-02 PROCEDURE — 80061 LIPID PANEL: CPT | Performed by: FAMILY MEDICINE

## 2020-01-02 PROCEDURE — 77067 SCR MAMMO BI INCL CAD: CPT | Performed by: FAMILY MEDICINE

## 2020-01-02 PROCEDURE — 85025 COMPLETE CBC W/AUTO DIFF WBC: CPT | Performed by: FAMILY MEDICINE

## 2020-03-09 ENCOUNTER — HOSPITAL ENCOUNTER (OUTPATIENT)
Dept: CT IMAGING | Facility: HOSPITAL | Age: 65
Discharge: HOME | End: 2020-03-09
Attending: FAMILY MEDICINE

## 2020-03-09 ENCOUNTER — HOSPITAL ENCOUNTER (OUTPATIENT)
Dept: CARDIOLOGY CLINIC | Facility: HOSPITAL | Age: 65
Discharge: HOME | End: 2020-03-09
Attending: FAMILY MEDICINE

## 2020-03-09 DIAGNOSIS — Z13.6 SCREENING FOR CARDIOVASCULAR CONDITION: ICD-10-CM

## 2020-03-09 DIAGNOSIS — Z13.9 ENCOUNTER FOR SCREENING: ICD-10-CM

## 2021-01-01 ENCOUNTER — EXTERNAL RECORD (OUTPATIENT)
Dept: OTHER | Age: 66
End: 2021-01-01

## 2021-01-18 ENCOUNTER — OFFICE VISIT (OUTPATIENT)
Dept: FAMILY MEDICINE CLINIC | Facility: CLINIC | Age: 66
End: 2021-01-18
Payer: COMMERCIAL

## 2021-01-18 VITALS
DIASTOLIC BLOOD PRESSURE: 70 MMHG | BODY MASS INDEX: 19.49 KG/M2 | HEART RATE: 78 BPM | WEIGHT: 110 LBS | OXYGEN SATURATION: 99 % | TEMPERATURE: 98 F | HEIGHT: 63.11 IN | SYSTOLIC BLOOD PRESSURE: 118 MMHG

## 2021-01-18 DIAGNOSIS — Z00.00 ROUTINE GENERAL MEDICAL EXAMINATION AT A HEALTH CARE FACILITY: Primary | ICD-10-CM

## 2021-01-18 DIAGNOSIS — Z12.4 SCREENING FOR CERVICAL CANCER: ICD-10-CM

## 2021-01-18 DIAGNOSIS — Z78.0 POST-MENOPAUSE: ICD-10-CM

## 2021-01-18 DIAGNOSIS — Z12.2 ENCOUNTER FOR SCREENING FOR LUNG CANCER: ICD-10-CM

## 2021-01-18 DIAGNOSIS — R93.1 ABNORMAL CT SCAN OF HEART: ICD-10-CM

## 2021-01-18 DIAGNOSIS — Z12.31 ENCOUNTER FOR MAMMOGRAM TO ESTABLISH BASELINE MAMMOGRAM: ICD-10-CM

## 2021-01-18 DIAGNOSIS — Z82.49 FAMILY HISTORY OF EARLY CAD: ICD-10-CM

## 2021-01-18 DIAGNOSIS — R91.1 PULMONARY NODULE: ICD-10-CM

## 2021-01-18 DIAGNOSIS — Z12.31 ENCOUNTER FOR SCREENING MAMMOGRAM FOR MALIGNANT NEOPLASM OF BREAST: ICD-10-CM

## 2021-01-18 PROCEDURE — 3074F SYST BP LT 130 MM HG: CPT | Performed by: FAMILY MEDICINE

## 2021-01-18 PROCEDURE — 90471 IMMUNIZATION ADMIN: CPT | Performed by: FAMILY MEDICINE

## 2021-01-18 PROCEDURE — 90670 PCV13 VACCINE IM: CPT | Performed by: FAMILY MEDICINE

## 2021-01-18 PROCEDURE — 3008F BODY MASS INDEX DOCD: CPT | Performed by: FAMILY MEDICINE

## 2021-01-18 PROCEDURE — 3078F DIAST BP <80 MM HG: CPT | Performed by: FAMILY MEDICINE

## 2021-01-18 PROCEDURE — 99397 PER PM REEVAL EST PAT 65+ YR: CPT | Performed by: FAMILY MEDICINE

## 2021-01-18 NOTE — PATIENT INSTRUCTIONS
Go to [facility] for fasting labs    Call to schedule mammogram and bone density    Call to schedule lung CT screening test    Call to schedule appt with cardiologist     Followup with me in 1 yr, sooner if needed

## 2021-01-23 ENCOUNTER — HOSPITAL ENCOUNTER (OUTPATIENT)
Dept: CT IMAGING | Facility: HOSPITAL | Age: 66
Discharge: HOME | End: 2021-01-23
Attending: FAMILY MEDICINE
Payer: COMMERCIAL

## 2021-01-23 DIAGNOSIS — Z12.2 ENCOUNTER FOR SCREENING FOR LUNG CANCER: ICD-10-CM

## 2021-01-23 PROCEDURE — 71271 CT THORAX LUNG CANCER SCR C-: CPT | Performed by: FAMILY MEDICINE

## 2021-01-24 LAB
ABSOLUTE BASOPHILS: 29 CELLS/UL (ref 0–200)
ABSOLUTE EOSINOPHILS: 79 CELLS/UL (ref 15–500)
ABSOLUTE LYMPHOCYTES: 742 CELLS/UL (ref 850–3900)
ABSOLUTE MONOCYTES: 281 CELLS/UL (ref 200–950)
ABSOLUTE NEUTROPHILS: 2470 CELLS/UL (ref 1500–7800)
ALBUMIN/GLOBULIN RATIO: 1.8 (CALC) (ref 1–2.5)
ALBUMIN: 3.9 G/DL (ref 3.6–5.1)
ALKALINE PHOSPHATASE: 61 U/L (ref 37–153)
ALT: 12 U/L (ref 6–29)
AST: 17 U/L (ref 10–35)
BASOPHILS: 0.8 %
BILIRUBIN, TOTAL: 0.6 MG/DL (ref 0.2–1.2)
BUN/CREATININE RATIO: 46 (CALC) (ref 6–22)
BUN: 30 MG/DL (ref 7–25)
CALCIUM: 8.9 MG/DL (ref 8.6–10.4)
CARBON DIOXIDE: 28 MMOL/L (ref 20–32)
CHLORIDE: 109 MMOL/L (ref 98–110)
CHOL/HDLC RATIO: 2 (CALC)
CHOLESTEROL, TOTAL: 196 MG/DL
CREATININE: 0.65 MG/DL (ref 0.5–0.99)
EGFR IF AFRICN AM: 108 ML/MIN/1.73M2
EGFR IF NONAFRICN AM: 93 ML/MIN/1.73M2
EOSINOPHILS: 2.2 %
GLOBULIN: 2.2 G/DL (CALC) (ref 1.9–3.7)
GLUCOSE: 93 MG/DL (ref 65–99)
HDL CHOLESTEROL: 99 MG/DL
HEMATOCRIT: 37.9 % (ref 35–45)
HEMOGLOBIN: 12.6 G/DL (ref 11.7–15.5)
LDL-CHOLESTEROL: 87 MG/DL (CALC)
LYMPHOCYTES: 20.6 %
MCH: 30.2 PG (ref 27–33)
MCHC: 33.2 G/DL (ref 32–36)
MCV: 90.9 FL (ref 80–100)
MONOCYTES: 7.8 %
MPV: 9.7 FL (ref 7.5–12.5)
NEUTROPHILS: 68.6 %
NON-HDL CHOLESTEROL: 97 MG/DL (CALC)
PLATELET COUNT: 208 THOUSAND/UL (ref 140–400)
POTASSIUM: 4.2 MMOL/L (ref 3.5–5.3)
PROTEIN, TOTAL: 6.1 G/DL (ref 6.1–8.1)
RDW: 12 % (ref 11–15)
RED BLOOD CELL COUNT: 4.17 MILLION/UL (ref 3.8–5.1)
SODIUM: 142 MMOL/L (ref 135–146)
TRIGLYCERIDES: 36 MG/DL
TSH W/REFLEX TO FT4: 1.34 MIU/L (ref 0.4–4.5)
WHITE BLOOD CELL COUNT: 3.6 THOUSAND/UL (ref 3.8–10.8)

## 2021-01-27 NOTE — PROGRESS NOTES
Roxana Pimentel is a 72year old female who is here for Patient presents with:  Wellness Visit: Wellness visit with Pap smear      HPI:     Here for physical    Screening:  Diet: tries to eat healthy  Exercise: stays active  Sleep: normal  Depression/A kg)  12/11/19 : 117 lb (53.1 kg)  08/23/19 : 112 lb 10.5 oz (51.1 kg)  01/16/19 : 108 lb (49 kg)  01/14/19 : 108 lb (49 kg)  01/07/19 : 108 lb (49 kg)      No Known Allergies    Family History   Problem Relation Age of Onset   • Cancer Mother 76        ovidio Smokeless tobacco: Never Used    Alcohol use: Yes      Frequency: 2-3 times a week      Drinks per session: 1 or 2      Comment: 4 beers weekly    Drug use: No          EXAM:   /70 (BP Location: Left arm, Patient Position: Sitting, Cuff Size: adult) mammogram for malignant neoplasm of breast  - [facility] RINA 2D+3D SCREENING BILAT (CPT=77067/91478); Future    4. Post-menopause  - XR DEXA BONE DENSITOMETRY (CPT=77080); Future    5. Screening for cervical cancer  - THINPREP TIS AND HPV MRNA E6/E7    6.  Abnorm

## 2021-02-08 DIAGNOSIS — R91.8 ABNORMAL CT LUNG SCREENING: Primary | ICD-10-CM

## 2021-02-08 DIAGNOSIS — R91.8 PULMONARY NODULES: ICD-10-CM

## 2021-02-13 ENCOUNTER — HOSPITAL ENCOUNTER (OUTPATIENT)
Dept: MAMMOGRAPHY | Age: 66
Discharge: HOME | End: 2021-02-13
Attending: FAMILY MEDICINE
Payer: COMMERCIAL

## 2021-02-13 ENCOUNTER — HOSPITAL ENCOUNTER (OUTPATIENT)
Dept: BONE DENSITY | Age: 66
Discharge: HOME | End: 2021-02-13
Attending: FAMILY MEDICINE
Payer: COMMERCIAL

## 2021-02-13 DIAGNOSIS — Z78.0 POST-MENOPAUSE: ICD-10-CM

## 2021-02-13 DIAGNOSIS — Z12.31 ENCOUNTER FOR SCREENING MAMMOGRAM FOR MALIGNANT NEOPLASM OF BREAST: ICD-10-CM

## 2021-02-13 DIAGNOSIS — Z12.31 ENCOUNTER FOR MAMMOGRAM TO ESTABLISH BASELINE MAMMOGRAM: ICD-10-CM

## 2021-02-13 PROCEDURE — 77080 DXA BONE DENSITY AXIAL: CPT | Performed by: FAMILY MEDICINE

## 2021-02-13 PROCEDURE — 77063 BREAST TOMOSYNTHESIS BI: CPT | Performed by: FAMILY MEDICINE

## 2021-02-13 PROCEDURE — 77067 SCR MAMMO BI INCL CAD: CPT | Performed by: FAMILY MEDICINE

## 2021-03-15 DIAGNOSIS — Z23 NEED FOR VACCINATION: ICD-10-CM

## 2021-03-22 ENCOUNTER — OFFICE VISIT (OUTPATIENT)
Dept: CARDIOLOGY | Age: 66
End: 2021-03-22

## 2021-03-22 ENCOUNTER — ANCILLARY PROCEDURE (OUTPATIENT)
Dept: CARDIOLOGY | Age: 66
End: 2021-03-22
Attending: INTERNAL MEDICINE

## 2021-03-22 VITALS
SYSTOLIC BLOOD PRESSURE: 124 MMHG | WEIGHT: 111 LBS | DIASTOLIC BLOOD PRESSURE: 82 MMHG | BODY MASS INDEX: 19.67 KG/M2 | HEART RATE: 73 BPM | HEIGHT: 63 IN

## 2021-03-22 DIAGNOSIS — R93.1 AGATSTON CORONARY ARTERY CALCIUM SCORE BETWEEN 200 AND 399: Primary | ICD-10-CM

## 2021-03-22 DIAGNOSIS — R00.2 PALPITATIONS: ICD-10-CM

## 2021-03-22 DIAGNOSIS — I65.23 BILATERAL CAROTID ARTERY STENOSIS: ICD-10-CM

## 2021-03-22 DIAGNOSIS — I83.10 VARICOSE VEINS OF LOWER EXTREMITY WITH INFLAMMATION, UNSPECIFIED LATERALITY: ICD-10-CM

## 2021-03-22 DIAGNOSIS — R07.2 PRECORDIAL PAIN: ICD-10-CM

## 2021-03-22 DIAGNOSIS — R93.1 AGATSTON CORONARY ARTERY CALCIUM SCORE BETWEEN 200 AND 399: ICD-10-CM

## 2021-03-22 DIAGNOSIS — I25.10 CORONARY ARTERY DISEASE INVOLVING NATIVE CORONARY ARTERY OF NATIVE HEART WITHOUT ANGINA PECTORIS: ICD-10-CM

## 2021-03-22 DIAGNOSIS — E78.00 HYPERCHOLESTEREMIA: ICD-10-CM

## 2021-03-22 PROCEDURE — 93000 ELECTROCARDIOGRAM COMPLETE: CPT | Performed by: INTERNAL MEDICINE

## 2021-03-22 PROCEDURE — 99205 OFFICE O/P NEW HI 60 MIN: CPT | Performed by: INTERNAL MEDICINE

## 2021-03-22 PROCEDURE — 93224 XTRNL ECG REC UP TO 48 HRS: CPT | Performed by: INTERNAL MEDICINE

## 2021-03-22 RX ORDER — FLUTICASONE PROPIONATE 50 MCG
1 SPRAY, SUSPENSION (ML) NASAL DAILY
COMMUNITY

## 2021-03-22 RX ORDER — ATORVASTATIN CALCIUM 10 MG/1
10 TABLET, FILM COATED ORAL DAILY
Qty: 90 TABLET | Refills: 1 | Status: SHIPPED | OUTPATIENT
Start: 2021-03-22

## 2021-03-22 SDOH — HEALTH STABILITY: MENTAL HEALTH: HOW OFTEN DO YOU HAVE A DRINK CONTAINING ALCOHOL?: 2-4 TIMES A MONTH

## 2021-03-22 SDOH — HEALTH STABILITY: PHYSICAL HEALTH: ON AVERAGE, HOW MANY DAYS PER WEEK DO YOU ENGAGE IN MODERATE TO STRENUOUS EXERCISE (LIKE A BRISK WALK)?: 5 DAYS

## 2021-03-22 SDOH — HEALTH STABILITY: PHYSICAL HEALTH: ON AVERAGE, HOW MANY MINUTES DO YOU ENGAGE IN EXERCISE AT THIS LEVEL?: 30 MIN

## 2021-03-22 SDOH — HEALTH STABILITY: MENTAL HEALTH: HOW MANY STANDARD DRINKS CONTAINING ALCOHOL DO YOU HAVE ON A TYPICAL DAY?: 1 OR 2

## 2021-03-29 ENCOUNTER — ORDER TRANSCRIPTION (OUTPATIENT)
Dept: ADMINISTRATIVE | Facility: HOSPITAL | Age: 66
End: 2021-03-29

## 2021-03-29 DIAGNOSIS — Z01.818 PREOP EXAMINATION: ICD-10-CM

## 2021-03-29 DIAGNOSIS — Z11.59 ENCOUNTER FOR SCREENING FOR OTHER VIRAL DISEASES: Primary | ICD-10-CM

## 2021-04-08 ENCOUNTER — TELEPHONE (OUTPATIENT)
Dept: CARDIOLOGY | Age: 66
End: 2021-04-08

## 2021-04-10 ENCOUNTER — HOSPITAL ENCOUNTER (OUTPATIENT)
Dept: CV DIAGNOSTICS | Facility: HOSPITAL | Age: 66
Discharge: HOME | End: 2021-04-10
Attending: INTERNAL MEDICINE
Payer: COMMERCIAL

## 2021-04-10 DIAGNOSIS — E78.00 HYPERCHOLESTEROLEMIA: ICD-10-CM

## 2021-04-10 DIAGNOSIS — I65.23 BILATERAL CAROTID ARTERY STENOSIS: ICD-10-CM

## 2021-04-10 DIAGNOSIS — R93.1 AGATSTON CORONARY ARTERY CALCIUM SCORE BETWEEN 200 AND 399: ICD-10-CM

## 2021-04-10 DIAGNOSIS — R07.2 PRECORDIAL PAIN: ICD-10-CM

## 2021-04-10 DIAGNOSIS — I83.10 VARICOSE VEINS OF LOWER EXTREMITY WITH INFLAMMATION, UNSPECIFIED LATERALITY: ICD-10-CM

## 2021-04-10 DIAGNOSIS — I25.10 CORONARY ARTERY DISEASE INVOLVING NATIVE CORONARY ARTERY OF NATIVE HEART WITHOUT ANGINA PECTORIS: ICD-10-CM

## 2021-04-10 DIAGNOSIS — R00.2 PALPITATIONS: ICD-10-CM

## 2021-04-10 PROCEDURE — 93306 TTE W/DOPPLER COMPLETE: CPT | Performed by: INTERNAL MEDICINE

## 2021-04-13 ENCOUNTER — TELEPHONE (OUTPATIENT)
Dept: CARDIOLOGY | Age: 66
End: 2021-04-13

## 2021-04-13 ENCOUNTER — LAB ENCOUNTER (OUTPATIENT)
Dept: LAB | Facility: HOSPITAL | Age: 66
End: 2021-04-13
Attending: INTERNAL MEDICINE
Payer: COMMERCIAL

## 2021-04-13 DIAGNOSIS — Z11.59 ENCOUNTER FOR SCREENING FOR OTHER VIRAL DISEASES: ICD-10-CM

## 2021-04-13 DIAGNOSIS — Z01.818 PREOP EXAMINATION: ICD-10-CM

## 2021-04-13 LAB
SARS-COV-2 RNA SPEC QL NAA+PROBE: NOT DETECTED
SPECIMEN SOURCE: NORMAL

## 2021-04-16 ENCOUNTER — HOSPITAL ENCOUNTER (OUTPATIENT)
Dept: CV DIAGNOSTICS | Facility: HOSPITAL | Age: 66
Discharge: HOME | End: 2021-04-16
Attending: INTERNAL MEDICINE
Payer: COMMERCIAL

## 2021-04-16 DIAGNOSIS — R07.2 PRECORDIAL PAIN: ICD-10-CM

## 2021-04-16 DIAGNOSIS — E78.00 HYPERCHOLESTEROLEMIA: ICD-10-CM

## 2021-04-16 DIAGNOSIS — I65.23 BILATERAL CAROTID ARTERY STENOSIS: ICD-10-CM

## 2021-04-16 DIAGNOSIS — R00.2 PALPITATIONS: ICD-10-CM

## 2021-04-16 DIAGNOSIS — R93.1 AGATSTON CORONARY ARTERY CALCIUM SCORE BETWEEN 200 AND 399: ICD-10-CM

## 2021-04-16 DIAGNOSIS — I83.10 VARICOSE VEINS OF LOWER EXTREMITY WITH INFLAMMATION, UNSPECIFIED LATERALITY: ICD-10-CM

## 2021-04-16 DIAGNOSIS — I25.10 CORONARY ARTERY DISEASE INVOLVING NATIVE CORONARY ARTERY OF NATIVE HEART WITHOUT ANGINA PECTORIS: ICD-10-CM

## 2021-04-16 PROCEDURE — 93018 CV STRESS TEST I&R ONLY: CPT | Performed by: INTERNAL MEDICINE

## 2021-04-16 PROCEDURE — 93350 STRESS TTE ONLY: CPT | Performed by: INTERNAL MEDICINE

## 2021-04-16 PROCEDURE — 93017 CV STRESS TEST TRACING ONLY: CPT | Performed by: INTERNAL MEDICINE

## 2021-04-19 ENCOUNTER — E-ADVICE (OUTPATIENT)
Dept: CARDIOLOGY | Age: 66
End: 2021-04-19

## 2021-06-13 LAB
ALBUMIN SERPL-MCNC: 4.2 G/DL (ref 3.6–5.1)
ALBUMIN/GLOB SERPL: 2 (CALC) (ref 1–2.5)
ALP SERPL-CCNC: 60 U/L (ref 37–153)
ALT SERPL-CCNC: 14 U/L (ref 6–29)
AST SERPL-CCNC: 21 U/L (ref 10–35)
BILIRUB SERPL-MCNC: 0.7 MG/DL (ref 0.2–1.2)
BUN SERPL-MCNC: 31 MG/DL (ref 7–25)
BUN/CREAT SERPL: 39 (CALC) (ref 6–22)
CALCIUM SERPL-MCNC: 9 MG/DL (ref 8.6–10.4)
CHLORIDE SERPL-SCNC: 110 MMOL/L (ref 98–110)
CHOLEST SERPL-MCNC: 192 MG/DL
CHOLEST/HDLC SERPL: 1.9 (CALC)
CO2 SERPL-SCNC: 29 MMOL/L (ref 20–32)
CREAT SERPL-MCNC: 0.8 MG/DL (ref 0.5–0.99)
GLOBULIN SER CALC-MCNC: 2.1 G/DL (CALC) (ref 1.9–3.7)
GLUCOSE SERPL-MCNC: 86 MG/DL (ref 65–99)
HDLC SERPL-MCNC: 100 MG/DL
LDLC SERPL CALC-MCNC: 78 MG/DL (CALC)
NONHDLC SERPL-MCNC: 92 MG/DL (CALC)
POTASSIUM SERPL-SCNC: 4.1 MMOL/L (ref 3.5–5.3)
PROT SERPL-MCNC: 6.3 G/DL (ref 6.1–8.1)
SODIUM SERPL-SCNC: 143 MMOL/L (ref 135–146)
TRIGL SERPL-MCNC: 63 MG/DL

## 2021-06-17 ENCOUNTER — OFFICE VISIT (OUTPATIENT)
Dept: CARDIOLOGY | Age: 66
End: 2021-06-17

## 2021-06-17 VITALS
WEIGHT: 110 LBS | DIASTOLIC BLOOD PRESSURE: 70 MMHG | BODY MASS INDEX: 19.49 KG/M2 | HEIGHT: 63 IN | SYSTOLIC BLOOD PRESSURE: 120 MMHG | HEART RATE: 67 BPM

## 2021-06-17 DIAGNOSIS — R07.2 PRECORDIAL PAIN: ICD-10-CM

## 2021-06-17 DIAGNOSIS — I65.23 BILATERAL CAROTID ARTERY STENOSIS: ICD-10-CM

## 2021-06-17 DIAGNOSIS — E78.00 HYPERCHOLESTEREMIA: Primary | ICD-10-CM

## 2021-06-17 DIAGNOSIS — R00.2 PALPITATIONS: ICD-10-CM

## 2021-06-17 DIAGNOSIS — I83.10 VARICOSE VEINS OF LOWER EXTREMITY WITH INFLAMMATION, UNSPECIFIED LATERALITY: ICD-10-CM

## 2021-06-17 DIAGNOSIS — I25.10 CORONARY ARTERY DISEASE INVOLVING NATIVE CORONARY ARTERY OF NATIVE HEART WITHOUT ANGINA PECTORIS: ICD-10-CM

## 2021-06-17 DIAGNOSIS — R93.1 AGATSTON CORONARY ARTERY CALCIUM SCORE BETWEEN 200 AND 399: ICD-10-CM

## 2021-06-17 PROCEDURE — 99214 OFFICE O/P EST MOD 30 MIN: CPT | Performed by: INTERNAL MEDICINE

## 2021-06-17 ASSESSMENT — PATIENT HEALTH QUESTIONNAIRE - PHQ9
CLINICAL INTERPRETATION OF PHQ2 SCORE: NO FURTHER SCREENING NEEDED
SUM OF ALL RESPONSES TO PHQ9 QUESTIONS 1 AND 2: 0
2. FEELING DOWN, DEPRESSED OR HOPELESS: NOT AT ALL
CLINICAL INTERPRETATION OF PHQ2 SCORE: NO FURTHER SCREENING NEEDED
CLINICAL INTERPRETATION OF PHQ9 SCORE: NO FURTHER SCREENING NEEDED
2. FEELING DOWN, DEPRESSED OR HOPELESS: NOT AT ALL
1. LITTLE INTEREST OR PLEASURE IN DOING THINGS: NOT AT ALL
SUM OF ALL RESPONSES TO PHQ9 QUESTIONS 1 AND 2: 0
SUM OF ALL RESPONSES TO PHQ9 QUESTIONS 1 AND 2: 0
1. LITTLE INTEREST OR PLEASURE IN DOING THINGS: NOT AT ALL

## 2021-09-06 LAB — AMB EXT COVID-19 RESULT: DETECTED

## 2021-09-07 ENCOUNTER — TELEPHONE (OUTPATIENT)
Dept: FAMILY MEDICINE CLINIC | Facility: CLINIC | Age: 66
End: 2021-09-07

## 2021-09-07 DIAGNOSIS — U07.1 COVID: Primary | ICD-10-CM

## 2021-09-07 NOTE — TELEPHONE ENCOUNTER
Patient c/o cough, temp as high as 101.7, nasal congestion and sore throat. Positive test result obtained from [facility] yesterday. She did go to [facility] on Saturday and thought she had a sinus infection so they put her on Augmentin.   She is wondering

## 2021-09-07 NOTE — TELEPHONE ENCOUNTER
Clem Berman is calling to let Dr Cary Arvizu know that she was tested positive for Covid yesterday, she said she qualifies for the antibody injection, she is just wondering what Dr Cary Arvizu thinks about it.  Please call Clem Berman at [phone]

## 2021-09-08 ENCOUNTER — NURSE ONLY (OUTPATIENT)
Dept: INFUSION CENTER | Age: 66
End: 2021-09-08
Attending: FAMILY MEDICINE
Payer: COMMERCIAL

## 2021-09-08 ENCOUNTER — TELEPHONE (OUTPATIENT)
Dept: CASE MANAGEMENT | Age: 66
End: 2021-09-08

## 2021-09-08 VITALS
BODY MASS INDEX: 18.44 KG/M2 | DIASTOLIC BLOOD PRESSURE: 79 MMHG | SYSTOLIC BLOOD PRESSURE: 139 MMHG | OXYGEN SATURATION: 98 % | HEIGHT: 64 IN | RESPIRATION RATE: 22 BRPM | WEIGHT: 108 LBS | HEART RATE: 71 BPM | TEMPERATURE: 99 F

## 2021-09-08 NOTE — PROGRESS NOTES
Pt discharged in good condition alert, breathing not in nay distress with gait steady. avs given. Pt aware population health will ffup.   No adverse reaction noted from polyclonal infusion

## 2021-09-08 NOTE — TELEPHONE ENCOUNTER
Referral for antibody infusion received from Dr Mic Scott. Ab infusion scheduled for patient at [address] [facility] for today at 2:45. Advised patient to arrive 15 minutes prior to appt and to call Registration Hotline at [phone] once they arrive.   Debi Bernabe

## 2021-09-09 ENCOUNTER — TELEPHONE (OUTPATIENT)
Dept: CASE MANAGEMENT | Age: 66
End: 2021-09-09

## 2021-09-09 NOTE — TELEPHONE ENCOUNTER
Pt received PAB infusion at [facility] on 9/8 for COVID-19. Please follow-up with pt for post-infusion assessment and home monitoring if needed. Thank you.

## 2021-09-09 NOTE — TELEPHONE ENCOUNTER
POSITIVE COVID 9/6/21  LOV 1/18/21  Appt made for Virtual visit-follow up 9/14/21    Reports slight improvement in symptoms. Low grade fever of 99, mild cough, SOB only when using stairs and fatigue is about the same. Denies chest pain, N/V/D, sore throat, joint pain. Declines daily monitoring, states she has friends and family checking in on her daily. Appt scheduled for virtual follow up. Recommended follow CDC guidelines, continue to monitor symptoms, rest, hydration, vitamin C, D, Zinc. Call office if worsening symptoms. She agreed with plan.

## 2021-09-14 ENCOUNTER — TELEMEDICINE (OUTPATIENT)
Dept: FAMILY MEDICINE CLINIC | Facility: CLINIC | Age: 66
End: 2021-09-14

## 2021-09-14 DIAGNOSIS — U07.1 COVID: Primary | ICD-10-CM

## 2021-09-14 DIAGNOSIS — R93.1 ABNORMAL CT SCAN OF HEART: ICD-10-CM

## 2021-09-14 PROCEDURE — 99214 OFFICE O/P EST MOD 30 MIN: CPT | Performed by: FAMILY MEDICINE

## 2021-09-14 RX ORDER — ATORVASTATIN CALCIUM 10 MG/1
10 TABLET, FILM COATED ORAL DAILY
COMMUNITY
Start: 2021-06-15

## 2021-09-14 RX ORDER — MULTIVIT,TX WITH IRON,MINERALS
50 TABLET, EXTENDED RELEASE ORAL DAILY
COMMUNITY

## 2021-09-14 RX ORDER — AMOXICILLIN AND CLAVULANATE POTASSIUM 875; 125 MG/1; MG/1
TABLET, FILM COATED ORAL EVERY 12 HOURS
COMMUNITY
End: 2022-01-20 | Stop reason: ALTCHOICE

## 2021-09-14 RX ORDER — VITAMIN B COMPLEX
25 TABLET ORAL DAILY
COMMUNITY

## 2021-09-14 RX ORDER — MULTIVIT-MIN/IRON/FOLIC ACID/K 18-600-40
500 CAPSULE ORAL DAILY
COMMUNITY

## 2021-09-14 RX ORDER — ZINC OXIDE 13 %
CREAM (GRAM) TOPICAL DAILY
COMMUNITY
End: 2021-09-14

## 2021-09-15 ENCOUNTER — PATIENT MESSAGE (OUTPATIENT)
Dept: FAMILY MEDICINE CLINIC | Facility: CLINIC | Age: 66
End: 2021-09-15

## 2021-09-15 NOTE — TELEPHONE ENCOUNTER
From: Shan Juan  To: Shyann Hernandes MD  Sent: 9/15/2021 3:39 AM CDT  Subject: LDCT Results 8/5/21    Attached is the results from the Ct scan mentioned in the subject line.

## 2021-09-15 NOTE — PROGRESS NOTES
Virtual/Telephone Jenny Sheffield is a 72year old female here today for a telemedicine audio and video visit. HPI:       1.  COVID  -she is overall doing better  -she is about 12 days from symptom onset  -was diangosed about 8 days ago • COLONOSCOPY  11/22/2013    Procedure: COLONOSCOPY;  Surgeon: Troy Barnett MD;  Location: [address] [place] [address] ENDOSCOPY   • COLONOSCOPY  01/2019    normal- repeat 5 yrs   • COLONOSCOPY N/A 1/23/2019    Procedure: COLONOSCOPY, POSSIBLE BIOPSY, POSSIBLE POLYPECTOMY Antacid 1000 MG Oral Chew Tab Chew 1,000 mg by mouth 3 (three) times daily. • Multiple Vitamins-Minerals (MULTIVITAL) Oral Chew Tab Chew by mouth. • Fluticasone Propionate 50 MCG/ACT Nasal Suspension USE TWO SPRAYS IN EACH NOSTRIL DAILY.  3 Bottle 0 discussed. Lastly, the patient confirmed that they were in [place]. Included in this visit, time may have been spent reviewing labs, medications, radiology tests and decision making.  Appropriate medical decision-making and tests are ordered as detail

## 2021-10-07 ENCOUNTER — IMAGING SERVICES (OUTPATIENT)
Dept: GENERAL RADIOLOGY | Age: 66
End: 2021-10-07
Attending: PODIATRIST

## 2021-10-07 ENCOUNTER — OFFICE VISIT (OUTPATIENT)
Dept: PODIATRY | Age: 66
End: 2021-10-07

## 2021-10-07 DIAGNOSIS — T84.84XA PAINFUL ORTHOPAEDIC HARDWARE (CMD): ICD-10-CM

## 2021-10-07 DIAGNOSIS — M21.611 BUNION OF RIGHT FOOT: ICD-10-CM

## 2021-10-07 DIAGNOSIS — M20.5X1 TOE CONTRACTURE, RIGHT: ICD-10-CM

## 2021-10-07 DIAGNOSIS — M20.11 HAV (HALLUX ABDUCTO VALGUS), RIGHT: Primary | ICD-10-CM

## 2021-10-07 PROCEDURE — 99204 OFFICE O/P NEW MOD 45 MIN: CPT | Performed by: PODIATRIST

## 2021-10-07 PROCEDURE — 73630 X-RAY EXAM OF FOOT: CPT | Performed by: PODIATRIST

## 2021-10-08 ENCOUNTER — TELEPHONE (OUTPATIENT)
Dept: PODIATRY | Age: 66
End: 2021-10-08

## 2021-10-08 VITALS — BODY MASS INDEX: 19.81 KG/M2 | HEIGHT: 63 IN | WEIGHT: 111.8 LBS

## 2021-10-08 DIAGNOSIS — M20.11 HAV (HALLUX ABDUCTO VALGUS), RIGHT: Primary | ICD-10-CM

## 2021-10-08 DIAGNOSIS — M20.5X1 TOE CONTRACTURE, RIGHT: ICD-10-CM

## 2021-10-08 DIAGNOSIS — T84.84XA PAINFUL ORTHOPAEDIC HARDWARE (CMD): ICD-10-CM

## 2021-12-09 ENCOUNTER — APPOINTMENT (OUTPATIENT)
Dept: CARDIOLOGY | Age: 66
End: 2021-12-09

## 2021-12-27 ENCOUNTER — PATIENT MESSAGE (OUTPATIENT)
Dept: FAMILY MEDICINE CLINIC | Facility: CLINIC | Age: 66
End: 2021-12-27

## 2021-12-27 NOTE — TELEPHONE ENCOUNTER
From: Carlos Juarez  To: Winnifred Ormond, MD  Sent: 12/27/2021 9:21 AM CST  Subject: Echo with Dr Luca Lim morning,   I have a pre op medical clearance appt scheduled for January 20, 2022.  Wondering if my echo done with Dr Adolfo Mustafa on 4/10/21

## 2022-01-06 ENCOUNTER — E-ADVICE (OUTPATIENT)
Dept: ORTHOPEDICS | Age: 67
End: 2022-01-06

## 2022-01-20 ENCOUNTER — OFFICE VISIT (OUTPATIENT)
Dept: FAMILY MEDICINE CLINIC | Facility: CLINIC | Age: 67
End: 2022-01-20
Payer: COMMERCIAL

## 2022-01-20 ENCOUNTER — TELEPHONE (OUTPATIENT)
Dept: FAMILY MEDICINE CLINIC | Facility: CLINIC | Age: 67
End: 2022-01-20

## 2022-01-20 VITALS
TEMPERATURE: 97 F | DIASTOLIC BLOOD PRESSURE: 70 MMHG | BODY MASS INDEX: 19.88 KG/M2 | HEIGHT: 63.66 IN | WEIGHT: 115 LBS | SYSTOLIC BLOOD PRESSURE: 122 MMHG | OXYGEN SATURATION: 97 % | HEART RATE: 62 BPM

## 2022-01-20 DIAGNOSIS — R91.1 PULMONARY NODULE: ICD-10-CM

## 2022-01-20 DIAGNOSIS — Z01.818 PREOPERATIVE CLEARANCE: Primary | ICD-10-CM

## 2022-01-20 DIAGNOSIS — N20.0 KIDNEY STONES: ICD-10-CM

## 2022-01-20 PROCEDURE — 3074F SYST BP LT 130 MM HG: CPT | Performed by: FAMILY MEDICINE

## 2022-01-20 PROCEDURE — 99215 OFFICE O/P EST HI 40 MIN: CPT | Performed by: FAMILY MEDICINE

## 2022-01-20 PROCEDURE — 3008F BODY MASS INDEX DOCD: CPT | Performed by: FAMILY MEDICINE

## 2022-01-20 PROCEDURE — 3078F DIAST BP <80 MM HG: CPT | Performed by: FAMILY MEDICINE

## 2022-01-20 RX ORDER — IBUPROFEN 800 MG/1
800 TABLET ORAL EVERY 8 HOURS PRN
COMMUNITY
Start: 2021-10-16

## 2022-01-20 NOTE — PROGRESS NOTES
Patient presents with:  Pre-Op Exam: Patient is scheduled for Left ESWL, Poss. Cystoscopy, Retrograde Pyelogram (RPG) and Insertion of Stent at [address] under general anesthesia with Dr. Swanson Adjutant. Date of surgery 01/24/2022.       PREO MEDS:  No outpatient medications have been marked as taking for the 1/20/22 encounter (Office Visit) with Heath Allen MD.        SOCIAL HISTORY:  Social History    Socioeconomic History      Marital status:       Spouse name: Not on file is negative.                          PHYSICAL EXAMINATION:  /70 (BP Location: Left arm, Patient Position: Sitting, Cuff Size: adult)   Pulse 62   Temp 96.5 °F (35.8 °C) (Other)   Ht 5' 3.66\" (1.617 m)   Wt 115 lb (52.2 kg)   LMP  (LMP Unknown)   SpO

## 2022-02-10 ENCOUNTER — OFFICE VISIT (OUTPATIENT)
Dept: FAMILY MEDICINE CLINIC | Facility: CLINIC | Age: 67
End: 2022-02-10
Payer: COMMERCIAL

## 2022-02-10 VITALS
BODY MASS INDEX: 19.88 KG/M2 | WEIGHT: 115 LBS | HEIGHT: 63.74 IN | DIASTOLIC BLOOD PRESSURE: 70 MMHG | SYSTOLIC BLOOD PRESSURE: 120 MMHG | HEART RATE: 72 BPM | TEMPERATURE: 97 F | OXYGEN SATURATION: 98 %

## 2022-02-10 DIAGNOSIS — Z13.220 SCREENING FOR HYPERLIPIDEMIA: ICD-10-CM

## 2022-02-10 DIAGNOSIS — Z13.0 SCREENING FOR IRON DEFICIENCY ANEMIA: ICD-10-CM

## 2022-02-10 DIAGNOSIS — R91.1 PULMONARY NODULE: ICD-10-CM

## 2022-02-10 DIAGNOSIS — Z12.31 ENCOUNTER FOR SCREENING MAMMOGRAM FOR MALIGNANT NEOPLASM OF BREAST: ICD-10-CM

## 2022-02-10 DIAGNOSIS — Z13.29 SCREENING FOR THYROID DISORDER: ICD-10-CM

## 2022-02-10 DIAGNOSIS — N20.0 KIDNEY STONES: ICD-10-CM

## 2022-02-10 DIAGNOSIS — Z00.00 ENCOUNTER FOR ANNUAL HEALTH EXAMINATION: Primary | ICD-10-CM

## 2022-02-10 DIAGNOSIS — Z13.1 SCREENING FOR DIABETES MELLITUS (DM): ICD-10-CM

## 2022-02-10 DIAGNOSIS — S92.919A CLOSED NONDISPLACED FRACTURE OF PHALANX OF TOE, UNSPECIFIED LATERALITY, UNSPECIFIED TOE, INITIAL ENCOUNTER: ICD-10-CM

## 2022-02-10 DIAGNOSIS — E46 PROTEIN-CALORIE MALNUTRITION, UNSPECIFIED SEVERITY (HCC): ICD-10-CM

## 2022-02-10 DIAGNOSIS — R91.8 ABNORMAL CT LUNG SCREENING: ICD-10-CM

## 2022-02-10 DIAGNOSIS — Z23 NEED FOR VACCINATION: ICD-10-CM

## 2022-02-10 DIAGNOSIS — Z11.59 NEED FOR HEPATITIS C SCREENING TEST: ICD-10-CM

## 2022-02-10 PROCEDURE — G0402 INITIAL PREVENTIVE EXAM: HCPCS | Performed by: FAMILY MEDICINE

## 2022-02-10 PROCEDURE — G0009 ADMIN PNEUMOCOCCAL VACCINE: HCPCS | Performed by: FAMILY MEDICINE

## 2022-02-10 PROCEDURE — 3074F SYST BP LT 130 MM HG: CPT | Performed by: FAMILY MEDICINE

## 2022-02-10 PROCEDURE — 96160 PT-FOCUSED HLTH RISK ASSMT: CPT | Performed by: FAMILY MEDICINE

## 2022-02-10 PROCEDURE — 90732 PPSV23 VACC 2 YRS+ SUBQ/IM: CPT | Performed by: FAMILY MEDICINE

## 2022-02-10 PROCEDURE — 3008F BODY MASS INDEX DOCD: CPT | Performed by: FAMILY MEDICINE

## 2022-02-10 PROCEDURE — 3078F DIAST BP <80 MM HG: CPT | Performed by: FAMILY MEDICINE

## 2022-02-10 PROCEDURE — 99397 PER PM REEVAL EST PAT 65+ YR: CPT | Performed by: FAMILY MEDICINE

## 2022-02-10 RX ORDER — ROSUVASTATIN CALCIUM 5 MG/1
TABLET, COATED ORAL
COMMUNITY
Start: 2022-01-21

## 2022-02-10 RX ORDER — HYDROCODONE BITARTRATE AND ACETAMINOPHEN 5; 325 MG/1; MG/1
1 TABLET ORAL EVERY 6 HOURS PRN
COMMUNITY
Start: 2022-01-26 | End: 2022-02-10 | Stop reason: ALTCHOICE

## 2022-02-16 ENCOUNTER — TELEPHONE (OUTPATIENT)
Dept: ORTHOPEDICS | Age: 67
End: 2022-02-16

## 2022-02-16 ENCOUNTER — TELEPHONE (OUTPATIENT)
Dept: FAMILY MEDICINE CLINIC | Facility: CLINIC | Age: 67
End: 2022-02-16

## 2022-02-16 NOTE — TELEPHONE ENCOUNTER
Patient is scheduled for Right Lapidus bunionectomy via lapiplasty method, Right fifth metatarsal removal of symptomatic hardware, Right fifth toe Extensor tenotomy/tendon lengthening at [facility] with with Dr. Marley Juarez on 02/22/2022.     They are asking for EKG (60 days), CBC/PLT and BMP (30 days)    Fax # [phone]

## 2022-02-16 NOTE — TELEPHONE ENCOUNTER
Spoke with patient, she is out of town. She has an appointment at [facility] on Friday 02/18/2022 for her labs. We have EKG done on 12/28/2021.

## 2022-02-18 ENCOUNTER — HOSPITAL ENCOUNTER (OUTPATIENT)
Dept: MAMMOGRAPHY | Age: 67
Discharge: HOME | End: 2022-02-18
Attending: FAMILY MEDICINE
Payer: MEDICARE

## 2022-02-18 DIAGNOSIS — Z12.31 ENCOUNTER FOR SCREENING MAMMOGRAM FOR MALIGNANT NEOPLASM OF BREAST: ICD-10-CM

## 2022-02-18 LAB
ALBUMIN/GLOBULIN RATIO: 2
ALBUMIN: 4.3 G/DL
ALKALINE PHOSPHATASE: 61
ALT: 11
AST: 17
BILIRUBIN, TOTAL: 0.7
BUN: 23
CALCIUM: 9.6
CARBON DIOXIDE: 29
CHLORIDE: 106
CHOL/HDLC RATIO: 2
CREATININE: 0.8 MG/DL
GLOBULIN: 2.2
GLUCOSE: 85
HCT: 40.8
HDL CHOLESTEROL: 104 MG/DL
HGB: 13.7
LDL CHOLESTEROL: 88 MG/DL (ref ?–130)
MCH: 31.1 PG
MCHC: 33.6 G/DL
MCV: 92.7 FL
MPV: 9.8
NON-HDL CHOLESTEROL: 102
PLATELET COUNT: 238
POTASSIUM: 4.1
PROTEIN, TOTAL: 6.5
RDW: 12.2 %
RED BLOOD CELL COUNT: 4.4
SODIUM: 141
TOTAL CHOLESTEROL: 206 MG/DL (ref ?–200)
TRIGLYCERIDES: 55 MG/DL
TSH W/REFLEX TO FT4: 2.71
WHITE BLOOD CELL COUNT: 3.5

## 2022-02-18 PROCEDURE — 77063 BREAST TOMOSYNTHESIS BI: CPT | Performed by: FAMILY MEDICINE

## 2022-02-18 PROCEDURE — 77067 SCR MAMMO BI INCL CAD: CPT | Performed by: FAMILY MEDICINE

## 2022-02-20 LAB — COVID-19 RESULT: NOT DETECTED

## 2022-02-21 ENCOUNTER — TELEPHONE (OUTPATIENT)
Dept: FAMILY MEDICINE CLINIC | Facility: CLINIC | Age: 67
End: 2022-02-21

## 2022-02-21 NOTE — TELEPHONE ENCOUNTER
Buddy Mendoza from Dr. Jake Velasquez office called back again to check on the status of the H&P, lab, and EKG as the patient is having surgery tomorrow. Per Ghassan Bland RN we're just waiting on Dr. Alvarado Fuelling to addend his note and then she will fax it over.
Faxed Surgical clearance, EKG, and lab report to [phone] att: Carmelo Talavera and to fax # [phone] att: Barbara Landa from Dr. Dev rowland
Harvey Grimm from Dr. Juice Mobley office called and said she needs [facility] Pre-Op clearance information. She said EKG, Labs and H&P. It can be faxed to [phone]. She is having surgery tomorrow.
yes

## 2022-02-22 ENCOUNTER — EXTERNAL RECORD (OUTPATIENT)
Dept: PODIATRY | Age: 67
End: 2022-02-22

## 2022-02-22 ENCOUNTER — APPOINTMENT (OUTPATIENT)
Dept: OTHER | Facility: PHYSICIAN GROUP | Age: 67
End: 2022-02-22
Attending: PODIATRIST

## 2022-02-22 PROCEDURE — 20680 REMOVAL OF IMPLANT DEEP: CPT | Performed by: PODIATRIST

## 2022-02-22 PROCEDURE — 28297 COR HLX VLGS JT ARTHRD: CPT | Performed by: PODIATRIST

## 2022-02-22 PROCEDURE — 28270 RELEASE OF FOOT CONTRACTURE: CPT | Performed by: PODIATRIST

## 2022-02-22 PROCEDURE — 28122 PARTIAL REMOVAL OF FOOT BONE: CPT | Performed by: PODIATRIST

## 2022-02-22 PROCEDURE — 28208 REPAIR OF FOOT TENDON: CPT | Performed by: PODIATRIST

## 2022-03-03 ENCOUNTER — OFFICE VISIT (OUTPATIENT)
Dept: PODIATRY | Age: 67
End: 2022-03-03

## 2022-03-03 DIAGNOSIS — M20.11 HAV (HALLUX ABDUCTO VALGUS), RIGHT: ICD-10-CM

## 2022-03-03 DIAGNOSIS — Z98.890 STATUS POST FOOT SURGERY: Primary | ICD-10-CM

## 2022-03-03 DIAGNOSIS — T84.84XA PAINFUL ORTHOPAEDIC HARDWARE (CMD): ICD-10-CM

## 2022-03-03 DIAGNOSIS — M20.5X1 TOE CONTRACTURE, RIGHT: ICD-10-CM

## 2022-03-03 PROCEDURE — 99024 POSTOP FOLLOW-UP VISIT: CPT | Performed by: PODIATRIST

## 2022-03-03 PROCEDURE — L4386 NON-PNEUM WALK BOOT PRE CST: HCPCS | Performed by: PODIATRIST

## 2022-03-03 RX ORDER — ROSUVASTATIN CALCIUM 5 MG/1
TABLET, COATED ORAL
COMMUNITY
Start: 2022-01-21

## 2022-03-03 RX ORDER — IBUPROFEN 600 MG/1
TABLET ORAL
COMMUNITY
Start: 2022-02-22

## 2022-03-03 RX ORDER — IBUPROFEN 800 MG/1
800 TABLET ORAL
COMMUNITY
Start: 2021-10-16 | End: 2022-03-17 | Stop reason: ALTCHOICE

## 2022-03-03 RX ORDER — HYDROCODONE BITARTRATE AND ACETAMINOPHEN 5; 325 MG/1; MG/1
1 TABLET ORAL EVERY 8 HOURS PRN
COMMUNITY
Start: 2022-02-22

## 2022-03-17 ENCOUNTER — OFFICE VISIT (OUTPATIENT)
Dept: PODIATRY | Age: 67
End: 2022-03-17

## 2022-03-17 DIAGNOSIS — Z98.890 STATUS POST FOOT SURGERY: Primary | ICD-10-CM

## 2022-03-17 DIAGNOSIS — M20.11 HAV (HALLUX ABDUCTO VALGUS), RIGHT: ICD-10-CM

## 2022-03-17 PROCEDURE — 99024 POSTOP FOLLOW-UP VISIT: CPT | Performed by: PODIATRIST

## 2022-03-18 LAB
ALBUMIN: 4.1 G/DL
ALBUMIN: 4.1 G/DL
ALKALINE PHOSPHATASE: 70
ALKALINE PHOSPHATASE: 70
ALT: 12
ALT: 12
AST: 18
AST: 18
BILIRUBIN, TOTAL: 0.6
BILIRUBIN, TOTAL: 0.6
BUN/CREATININE RATIO: 26
BUN/CREATININE RATIO: 26
BUN: 20
BUN: 20
CARBON DIOXIDE: 30
CARBON DIOXIDE: 30
CHLORIDE: 105
CHLORIDE: 105
CHOL/HDL RATIO: 2.4
CHOL/HDL RATIO: 2.4
CHOLESTEROL: 198
CHOLESTEROL: 198
CREATININE: 0.77 MG/DL
CREATININE: 0.77 MG/DL
GLOBULIN: 2.1
GLOBULIN: 2.1
GLUCOSE: 92
GLUCOSE: 92
HDL CHOL: 84
HDL CHOL: 84
LDL CHOLESTEROL: 96 MG/DL (ref ?–130)
LDL CHOLESTEROL: 96 MG/DL (ref ?–130)
POTASSIUM: 4
POTASSIUM: 4
SODIUM: 141
SODIUM: 141
TOTAL PROTEIN: 6.2
TOTAL PROTEIN: 6.2
TRIGLYCERIDES: 93 MG/DL
TRIGLYCERIDES: 93 MG/DL
VLDL: 19
VLDL: 19

## 2022-03-24 ENCOUNTER — TELEPHONE (OUTPATIENT)
Dept: PODIATRY | Age: 67
End: 2022-03-24

## 2022-03-28 ENCOUNTER — EXTERNAL RECORD (OUTPATIENT)
Dept: HEALTH INFORMATION MANAGEMENT | Facility: OTHER | Age: 67
End: 2022-03-28

## 2022-03-28 ENCOUNTER — TELEPHONE (OUTPATIENT)
Dept: PODIATRY | Age: 67
End: 2022-03-28

## 2022-04-05 ENCOUNTER — TELEPHONE (OUTPATIENT)
Dept: FAMILY MEDICINE CLINIC | Facility: CLINIC | Age: 67
End: 2022-04-05

## 2022-04-07 ENCOUNTER — IMAGING SERVICES (OUTPATIENT)
Dept: GENERAL RADIOLOGY | Age: 67
End: 2022-04-07
Attending: PODIATRIST

## 2022-04-07 ENCOUNTER — OFFICE VISIT (OUTPATIENT)
Dept: PODIATRY | Age: 67
End: 2022-04-07

## 2022-04-07 DIAGNOSIS — T84.84XA PAINFUL ORTHOPAEDIC HARDWARE (CMD): ICD-10-CM

## 2022-04-07 DIAGNOSIS — Z98.890 STATUS POST FOOT SURGERY: ICD-10-CM

## 2022-04-07 DIAGNOSIS — Z98.890 STATUS POST FOOT SURGERY: Primary | ICD-10-CM

## 2022-04-07 DIAGNOSIS — M20.11 HAV (HALLUX ABDUCTO VALGUS), RIGHT: ICD-10-CM

## 2022-04-07 PROCEDURE — 99024 POSTOP FOLLOW-UP VISIT: CPT | Performed by: PODIATRIST

## 2022-04-07 PROCEDURE — 73630 X-RAY EXAM OF FOOT: CPT | Performed by: RADIOLOGY

## 2022-04-12 NOTE — TELEPHONE ENCOUNTER
Cholesterol levels are similar. May need higher dose of crestor. She can f/u with cardiologist on their plan with it.

## 2022-05-09 ENCOUNTER — EXTERNAL RECORD (OUTPATIENT)
Dept: HEALTH INFORMATION MANAGEMENT | Facility: OTHER | Age: 67
End: 2022-05-09

## 2022-05-11 ENCOUNTER — EXTERNAL RECORD (OUTPATIENT)
Dept: HEALTH INFORMATION MANAGEMENT | Facility: OTHER | Age: 67
End: 2022-05-11

## 2022-05-19 ENCOUNTER — TELEPHONE (OUTPATIENT)
Dept: PODIATRY | Age: 67
End: 2022-05-19

## 2022-06-01 ENCOUNTER — EXTERNAL RECORD (OUTPATIENT)
Dept: HEALTH INFORMATION MANAGEMENT | Facility: OTHER | Age: 67
End: 2022-06-01

## 2022-06-02 ENCOUNTER — IMAGING SERVICES (OUTPATIENT)
Dept: GENERAL RADIOLOGY | Age: 67
End: 2022-06-02
Attending: PODIATRIST

## 2022-06-02 ENCOUNTER — OFFICE VISIT (OUTPATIENT)
Dept: PODIATRY | Age: 67
End: 2022-06-02

## 2022-06-02 ENCOUNTER — EXTERNAL RECORD (OUTPATIENT)
Dept: HEALTH INFORMATION MANAGEMENT | Facility: OTHER | Age: 67
End: 2022-06-02

## 2022-06-02 DIAGNOSIS — T84.84XA PAINFUL ORTHOPAEDIC HARDWARE (CMD): ICD-10-CM

## 2022-06-02 DIAGNOSIS — M20.11 HAV (HALLUX ABDUCTO VALGUS), RIGHT: ICD-10-CM

## 2022-06-02 DIAGNOSIS — Z98.890 STATUS POST FOOT SURGERY: ICD-10-CM

## 2022-06-02 DIAGNOSIS — Z98.890 STATUS POST FOOT SURGERY: Primary | ICD-10-CM

## 2022-06-02 DIAGNOSIS — M20.5X1 TOE CONTRACTURE, RIGHT: ICD-10-CM

## 2022-06-02 PROCEDURE — 99213 OFFICE O/P EST LOW 20 MIN: CPT | Performed by: PODIATRIST

## 2022-06-02 PROCEDURE — 73630 X-RAY EXAM OF FOOT: CPT | Performed by: RADIOLOGY

## 2022-07-19 ENCOUNTER — TELEPHONE (OUTPATIENT)
Dept: FAMILY MEDICINE CLINIC | Facility: CLINIC | Age: 67
End: 2022-07-19

## 2022-07-19 LAB
AMB EXT BUN: 21 MG/DL (ref 7–25)
AMB EXT CALCIUM: 9.3 (ref 8.7–10.4)
AMB EXT CARBON DIOXIDE: 29 (ref 21–31)
AMB EXT CHLORIDE: 105 (ref 98–107)
AMB EXT CREATININE: 0.77 MG/DL (ref 0.59–1.2)
AMB EXT GLUCOSE: 81 MG/DL (ref 70–105)
AMB EXT HEMATOCRIT: 42 (ref 34.2–51.3)
AMB EXT HEMOGLOBIN: 13.4 (ref 11.2–15.7)
AMB EXT MCV: 94 (ref 76.2–99.6)
AMB EXT PLATELETS: 227 (ref 150–450)
AMB EXT POSTASSIUM: 4.3 MMOL/L (ref 3.5–5.5)
AMB EXT SODIUM: 144 MMOL/L (ref 135–145)
AMB EXT WBC: 3.5 X10(3)UL (ref 4–10)

## 2022-07-19 NOTE — TELEPHONE ENCOUNTER
Patient calling is having surgery for kidney stone removal   DOS 8/17/2022    Dr Nestor Quinteros    Per patient orders were sent to office     Will need pre op appointment

## 2022-07-25 ENCOUNTER — ORDER TRANSCRIPTION (OUTPATIENT)
Dept: ADMINISTRATIVE | Facility: HOSPITAL | Age: 67
End: 2022-07-25

## 2022-07-25 DIAGNOSIS — Z13.6 SCREENING FOR CARDIOVASCULAR CONDITION: Primary | ICD-10-CM

## 2022-07-26 LAB
AMB EXT CHOLESTEROL, TOTAL: 202 MG/DL (ref 0–200)
AMB EXT HDL CHOLESTEROL: 95 MG/DL (ref 23–92)
AMB EXT LDL CHOLESTEROL, DIRECT: 97 MG/DL
AMB EXT TRIGLYCERIDES: 48 MG/DL (ref 0–150)
AMB EXT VLDL: 10 MG/DL

## 2022-08-02 ENCOUNTER — PATIENT MESSAGE (OUTPATIENT)
Dept: FAMILY MEDICINE CLINIC | Facility: CLINIC | Age: 67
End: 2022-08-02

## 2022-08-03 ENCOUNTER — OFFICE VISIT (OUTPATIENT)
Dept: FAMILY MEDICINE CLINIC | Facility: CLINIC | Age: 67
End: 2022-08-03
Payer: COMMERCIAL

## 2022-08-03 VITALS
OXYGEN SATURATION: 95 % | BODY MASS INDEX: 20.39 KG/M2 | SYSTOLIC BLOOD PRESSURE: 110 MMHG | WEIGHT: 118 LBS | TEMPERATURE: 97 F | HEIGHT: 63.74 IN | HEART RATE: 78 BPM | DIASTOLIC BLOOD PRESSURE: 60 MMHG

## 2022-08-03 DIAGNOSIS — R91.1 PULMONARY NODULE: ICD-10-CM

## 2022-08-03 DIAGNOSIS — N20.0 KIDNEY STONES: ICD-10-CM

## 2022-08-03 DIAGNOSIS — Z01.818 PREOPERATIVE CLEARANCE: Primary | ICD-10-CM

## 2022-08-03 DIAGNOSIS — R93.1 ABNORMAL CT SCAN OF HEART: ICD-10-CM

## 2022-08-03 PROCEDURE — 3008F BODY MASS INDEX DOCD: CPT | Performed by: FAMILY MEDICINE

## 2022-08-03 PROCEDURE — 3074F SYST BP LT 130 MM HG: CPT | Performed by: FAMILY MEDICINE

## 2022-08-03 PROCEDURE — 99215 OFFICE O/P EST HI 40 MIN: CPT | Performed by: FAMILY MEDICINE

## 2022-08-03 PROCEDURE — 3078F DIAST BP <80 MM HG: CPT | Performed by: FAMILY MEDICINE

## 2022-08-03 RX ORDER — MOMETASONE FUROATE 50 UG/1
2 SPRAY, METERED NASAL DAILY
COMMUNITY
Start: 2022-07-06

## 2022-08-03 RX ORDER — EZETIMIBE 10 MG/1
10 TABLET ORAL DAILY
COMMUNITY
Start: 2022-08-01

## 2022-08-03 NOTE — TELEPHONE ENCOUNTER
From: Liza Hilliard  To: Kashmir Keller MD  Sent: 8/2/2022 6:42 PM CDT  Subject: Dr Watson Mcgowan lipid results from 7/26/22    Hello, thought you would like a copy of the results.

## 2022-09-08 ENCOUNTER — APPOINTMENT (OUTPATIENT)
Dept: PODIATRY | Age: 67
End: 2022-09-08

## 2022-09-09 ENCOUNTER — APPOINTMENT (OUTPATIENT)
Dept: PODIATRY | Age: 67
End: 2022-09-09

## 2022-09-09 ENCOUNTER — OFFICE VISIT (OUTPATIENT)
Dept: PODIATRY | Age: 67
End: 2022-09-09

## 2022-09-09 DIAGNOSIS — M20.11 HAV (HALLUX ABDUCTO VALGUS), RIGHT: ICD-10-CM

## 2022-09-09 DIAGNOSIS — T84.84XA PAINFUL ORTHOPAEDIC HARDWARE (CMD): ICD-10-CM

## 2022-09-09 DIAGNOSIS — M20.5X1 TOE CONTRACTURE, RIGHT: ICD-10-CM

## 2022-09-09 DIAGNOSIS — Z98.890 STATUS POST FOOT SURGERY: Primary | ICD-10-CM

## 2022-09-09 PROCEDURE — 99213 OFFICE O/P EST LOW 20 MIN: CPT | Performed by: PODIATRIST

## 2022-09-21 ENCOUNTER — HOSPITAL ENCOUNTER (OUTPATIENT)
Dept: CT IMAGING | Age: 67
Discharge: HOME | End: 2022-09-21
Attending: INTERNAL MEDICINE

## 2022-09-21 DIAGNOSIS — Z13.6 SCREENING FOR CARDIOVASCULAR CONDITION: ICD-10-CM

## 2022-09-21 NOTE — PROGRESS NOTES
Date of Service 9/21/2022    Ivon Hassan  Date of Birth 11/26/1955    Patient Age: 77year old    PCP: Annamaria Rosenbaum MD  [place] [address]    CARDIOLOGIST:  Dr. Dannial Eisenmenger  [facility]/[place]    Consult Type  Type Scan/Screening: Heart Scan     Preliminary Heart Scan Score: 304.64   (Heart Scan done in 2020; hrufc=299.51.)                Body Mass Index  There is no height or weight on file to calculate BMI. Lipid Profile  Cholesterol: 202, done on 7/26/2022. HDL Cholesterol: 95, done on 7/26/2022. LDL Cholesterol: 96, done on 3/18/2022. TriGlycerides 48, done on 7/26/2022. Nurse Review  Risk factor information and results reviewed with Nurse: Yes    Recommended Follow Up:  Consult your physician regarding[de-identified] Final Heart Scan Report; Discuss potential for Incidental Finding    No data recorded      Recommendations for Change:  Nutrition Changes: Low Saturated Fat; Increase Fiber;Low Fat Dairy     Cholesterol Modification (goal of therapy depends upon your risk): Increase HDL (Healthy/Good) Normal >45 Men >55 Women;  Decrease LDL (Lousy/Bad) Ideal <100    Exercise: Enhance Current Program           Repeat Heart Scan: 3 Years if Calcium Score is > 0. 0; Discuss with your Physician          Leon Recommended Resources:  Recommended Resources: Upcoming Classes, Medical Services and Health Library [email or web address]. Angelique Plata RN        Please Contact the Nurse Heart Line with any Questions or Concerns [phone].

## 2023-01-26 ENCOUNTER — TELEPHONE (OUTPATIENT)
Dept: FAMILY MEDICINE CLINIC | Facility: CLINIC | Age: 68
End: 2023-01-26

## 2023-01-26 ENCOUNTER — PATIENT MESSAGE (OUTPATIENT)
Dept: FAMILY MEDICINE CLINIC | Facility: CLINIC | Age: 68
End: 2023-01-26

## 2023-01-26 LAB — AMB EXT COVID-19 RESULT: DETECTED

## 2023-01-26 NOTE — TELEPHONE ENCOUNTER
Patient calling did home Covid test and is positive    C/o temperature 100.4, some chest congestion.  Cough, headache and sore throat    Wants to know if something can be prescribed

## 2023-01-26 NOTE — TELEPHONE ENCOUNTER
From: Moo Kendall  To: Casimiro White MD  Sent: 1/26/2023 2:25 PM CST  Subject: Positive home Covid test     Hello, I just wanted to let you know that I tested positive for Covid today. Sore throat, mild cough and sneezing. Headache Last temp at 2:20 today, 100 degrees.

## 2023-01-26 NOTE — TELEPHONE ENCOUNTER
Symptom onset 1/24/23. Complains of sore throat, temp 100.4, mild cough, fatigue, headache, and runny nose. She is taking tylenol for symptoms. I did advise adding musinex for cough. She VU. She will call tomorrow with condition update if she feels she would like the Paxlovid prescription.

## 2023-02-13 ENCOUNTER — OFFICE VISIT (OUTPATIENT)
Dept: FAMILY MEDICINE CLINIC | Facility: CLINIC | Age: 68
End: 2023-02-13
Payer: COMMERCIAL

## 2023-02-13 VITALS
HEIGHT: 63.39 IN | SYSTOLIC BLOOD PRESSURE: 122 MMHG | WEIGHT: 121 LBS | OXYGEN SATURATION: 94 % | BODY MASS INDEX: 21.17 KG/M2 | DIASTOLIC BLOOD PRESSURE: 70 MMHG | TEMPERATURE: 98 F | HEART RATE: 76 BPM

## 2023-02-13 DIAGNOSIS — Z78.0 POST-MENOPAUSAL: ICD-10-CM

## 2023-02-13 DIAGNOSIS — E46 PROTEIN-CALORIE MALNUTRITION, UNSPECIFIED SEVERITY (HCC): ICD-10-CM

## 2023-02-13 DIAGNOSIS — R91.1 PULMONARY NODULE: ICD-10-CM

## 2023-02-13 DIAGNOSIS — S92.919A CLOSED NONDISPLACED FRACTURE OF PHALANX OF TOE, UNSPECIFIED LATERALITY, UNSPECIFIED TOE, INITIAL ENCOUNTER: ICD-10-CM

## 2023-02-13 DIAGNOSIS — Z12.31 ENCOUNTER FOR SCREENING MAMMOGRAM FOR MALIGNANT NEOPLASM OF BREAST: ICD-10-CM

## 2023-02-13 DIAGNOSIS — E78.2 MIXED HYPERLIPIDEMIA: ICD-10-CM

## 2023-02-13 DIAGNOSIS — N20.0 KIDNEY STONES: ICD-10-CM

## 2023-02-13 DIAGNOSIS — U07.1 COVID: ICD-10-CM

## 2023-02-13 DIAGNOSIS — Z00.00 ENCOUNTER FOR ANNUAL HEALTH EXAMINATION: Primary | ICD-10-CM

## 2023-02-13 RX ORDER — PYRITHIONE ZINC 1 G/ML
LOTION/SHAMPOO TOPICAL DAILY
COMMUNITY

## 2023-03-03 ENCOUNTER — HOSPITAL ENCOUNTER (OUTPATIENT)
Dept: BONE DENSITY | Age: 68
Discharge: HOME | End: 2023-03-03
Attending: FAMILY MEDICINE
Payer: MEDICARE

## 2023-03-03 ENCOUNTER — HOSPITAL ENCOUNTER (OUTPATIENT)
Dept: MAMMOGRAPHY | Age: 68
Discharge: HOME | End: 2023-03-03
Attending: FAMILY MEDICINE
Payer: MEDICARE

## 2023-03-03 DIAGNOSIS — Z78.0 POST-MENOPAUSAL: ICD-10-CM

## 2023-03-03 DIAGNOSIS — Z12.31 ENCOUNTER FOR SCREENING MAMMOGRAM FOR MALIGNANT NEOPLASM OF BREAST: ICD-10-CM

## 2023-03-03 PROCEDURE — 77063 BREAST TOMOSYNTHESIS BI: CPT | Performed by: FAMILY MEDICINE

## 2023-03-03 PROCEDURE — 77067 SCR MAMMO BI INCL CAD: CPT | Performed by: FAMILY MEDICINE

## 2023-03-03 PROCEDURE — 77080 DXA BONE DENSITY AXIAL: CPT | Performed by: FAMILY MEDICINE

## 2023-03-07 LAB
ABSOLUTE BASOPHILS: 39 CELLS/UL (ref 0–200)
ABSOLUTE EOSINOPHILS: 260 CELLS/UL (ref 15–500)
ABSOLUTE LYMPHOCYTES: 774 CELLS/UL (ref 850–3900)
ABSOLUTE MONOCYTES: 515 CELLS/UL (ref 200–950)
ABSOLUTE NEUTROPHILS: 3312 CELLS/UL (ref 1500–7800)
ALBUMIN/GLOBULIN RATIO: 2 (CALC) (ref 1–2.5)
ALBUMIN: 4.1 G/DL (ref 3.6–5.1)
ALKALINE PHOSPHATASE: 85 U/L (ref 37–153)
ALT: 14 U/L (ref 6–29)
AST: 20 U/L (ref 10–35)
BASOPHILS: 0.8 %
BILIRUBIN, TOTAL: 0.4 MG/DL (ref 0.2–1.2)
BUN: 16 MG/DL (ref 7–25)
CALCIUM: 9.5 MG/DL (ref 8.6–10.4)
CARBON DIOXIDE: 31 MMOL/L (ref 20–32)
CHLORIDE: 107 MMOL/L (ref 98–110)
CHOL/HDLC RATIO: 2.1 (CALC)
CHOLESTEROL, TOTAL: 184 MG/DL
CREATININE: 0.72 MG/DL (ref 0.5–1.05)
EGFR: 92 ML/MIN/1.73M2
EOSINOPHILS: 5.3 %
GLOBULIN: 2.1 G/DL (CALC) (ref 1.9–3.7)
GLUCOSE: 90 MG/DL (ref 65–99)
HDL CHOLESTEROL: 87 MG/DL
HEMATOCRIT: 40.1 % (ref 35–45)
HEMOGLOBIN: 13.3 G/DL (ref 11.7–15.5)
LDL-CHOLESTEROL: 79 MG/DL (CALC)
LYMPHOCYTES: 15.8 %
MCH: 30.2 PG (ref 27–33)
MCHC: 33.2 G/DL (ref 32–36)
MCV: 91.1 FL (ref 80–100)
MONOCYTES: 10.5 %
MPV: 10.2 FL (ref 7.5–12.5)
NEUTROPHILS: 67.6 %
NON-HDL CHOLESTEROL: 97 MG/DL (CALC)
PLATELET COUNT: 243 THOUSAND/UL (ref 140–400)
POTASSIUM: 4.2 MMOL/L (ref 3.5–5.3)
PROTEIN, TOTAL: 6.2 G/DL (ref 6.1–8.1)
RDW: 12.6 % (ref 11–15)
RED BLOOD CELL COUNT: 4.4 MILLION/UL (ref 3.8–5.1)
SODIUM: 144 MMOL/L (ref 135–146)
TRIGLYCERIDES: 95 MG/DL
TSH W/REFLEX TO FT4: 2.53 MIU/L (ref 0.4–4.5)
WHITE BLOOD CELL COUNT: 4.9 THOUSAND/UL (ref 3.8–10.8)

## 2023-03-08 ENCOUNTER — OFFICE VISIT (OUTPATIENT)
Dept: PODIATRY | Age: 68
End: 2023-03-08

## 2023-03-08 ENCOUNTER — IMAGING SERVICES (OUTPATIENT)
Dept: GENERAL RADIOLOGY | Age: 68
End: 2023-03-08
Attending: PODIATRIST

## 2023-03-08 DIAGNOSIS — Z98.890 STATUS POST FOOT SURGERY: ICD-10-CM

## 2023-03-08 DIAGNOSIS — I83.893 VARICOSE VEINS OF BILATERAL LOWER EXTREMITIES WITH OTHER COMPLICATIONS: ICD-10-CM

## 2023-03-08 DIAGNOSIS — T84.84XA PAINFUL ORTHOPAEDIC HARDWARE (CMD): ICD-10-CM

## 2023-03-08 DIAGNOSIS — M20.11 HAV (HALLUX ABDUCTO VALGUS), RIGHT: ICD-10-CM

## 2023-03-08 DIAGNOSIS — Z98.890 STATUS POST FOOT SURGERY: Primary | ICD-10-CM

## 2023-03-08 PROCEDURE — 73630 X-RAY EXAM OF FOOT: CPT | Performed by: RADIOLOGY

## 2023-03-08 PROCEDURE — 99213 OFFICE O/P EST LOW 20 MIN: CPT | Performed by: PODIATRIST

## 2023-03-09 ENCOUNTER — APPOINTMENT (OUTPATIENT)
Dept: PODIATRY | Age: 68
End: 2023-03-09

## 2023-04-19 ENCOUNTER — TELEPHONE (OUTPATIENT)
Dept: FAMILY MEDICINE CLINIC | Facility: CLINIC | Age: 68
End: 2023-04-19

## 2023-04-28 ENCOUNTER — OFFICE VISIT (OUTPATIENT)
Facility: CLINIC | Age: 68
End: 2023-04-28
Payer: COMMERCIAL

## 2023-04-28 VITALS
BODY MASS INDEX: 21.77 KG/M2 | RESPIRATION RATE: 16 BRPM | HEART RATE: 62 BPM | WEIGHT: 124.38 LBS | SYSTOLIC BLOOD PRESSURE: 122 MMHG | OXYGEN SATURATION: 99 % | DIASTOLIC BLOOD PRESSURE: 70 MMHG | HEIGHT: 63.5 IN

## 2023-04-28 DIAGNOSIS — R91.8 PULMONARY NODULES/LESIONS, MULTIPLE: Primary | ICD-10-CM

## 2023-04-28 PROCEDURE — 3008F BODY MASS INDEX DOCD: CPT | Performed by: INTERNAL MEDICINE

## 2023-04-28 PROCEDURE — 99204 OFFICE O/P NEW MOD 45 MIN: CPT | Performed by: INTERNAL MEDICINE

## 2023-04-28 PROCEDURE — 3074F SYST BP LT 130 MM HG: CPT | Performed by: INTERNAL MEDICINE

## 2023-04-28 PROCEDURE — 3078F DIAST BP <80 MM HG: CPT | Performed by: INTERNAL MEDICINE

## 2023-04-28 RX ORDER — BETAMETHASONE DIPROPIONATE 0.5 MG/G
CREAM TOPICAL
COMMUNITY
Start: 2023-04-19

## 2023-04-28 NOTE — PATIENT INSTRUCTIONS
plan for CT in 6 months then see me           - to begin flutter valve - once a day     Mikie Jerez MD  Pulmonary Medicine  4/28/2023

## 2023-05-01 ENCOUNTER — TELEPHONE (OUTPATIENT)
Facility: CLINIC | Age: 68
End: 2023-05-01

## 2023-05-01 DIAGNOSIS — R05.9 COUGH, UNSPECIFIED TYPE: Primary | ICD-10-CM

## 2023-05-01 NOTE — TELEPHONE ENCOUNTER
Per Dr Anastacio Hua, Can you please order patient a flutter valve to see if her insurance will cover   [address] rep notified that the order was sent. Called patient and informed that if she does not hear anything within 1 week to call our office  and notify us. Pt verbalized understanding.

## 2023-07-18 PROBLEM — T46.6X5A MYALGIA DUE TO STATIN: Status: ACTIVE | Noted: 2023-07-18

## 2023-07-18 PROBLEM — M79.10 MYALGIA DUE TO STATIN: Status: ACTIVE | Noted: 2023-07-18

## 2023-09-28 ENCOUNTER — TELEPHONE (OUTPATIENT)
Facility: CLINIC | Age: 68
End: 2023-09-28

## 2023-09-28 NOTE — TELEPHONE ENCOUNTER
Spoke with Pt. Made aware CT chest order by Dr. Lawanda Campbell has been faxed to [facility] in [place]. Pt advised to ask for a copy as well as the disc and bring those to her next appointment. Pt verbalized understanding.

## 2023-09-28 NOTE — TELEPHONE ENCOUNTER
Pt would like her CT lung screening order faxed to [facility] in [place].  Fax number: [phone]

## 2023-10-25 ENCOUNTER — TELEPHONE (OUTPATIENT)
Facility: CLINIC | Age: 68
End: 2023-10-25

## 2023-10-25 NOTE — TELEPHONE ENCOUNTER
Received fax from [facility] with results of CT scan chest completed 10/23/23. Placed fax in dr Daisy Grey in basket for review.

## 2023-11-03 ENCOUNTER — OFFICE VISIT (OUTPATIENT)
Facility: CLINIC | Age: 68
End: 2023-11-03
Payer: COMMERCIAL

## 2023-11-03 VITALS
DIASTOLIC BLOOD PRESSURE: 70 MMHG | HEART RATE: 73 BPM | WEIGHT: 120 LBS | HEIGHT: 63.5 IN | OXYGEN SATURATION: 96 % | SYSTOLIC BLOOD PRESSURE: 120 MMHG | RESPIRATION RATE: 16 BRPM | BODY MASS INDEX: 21 KG/M2

## 2023-11-03 DIAGNOSIS — R91.1 PULMONARY NODULE: Primary | ICD-10-CM

## 2023-11-03 PROCEDURE — 3078F DIAST BP <80 MM HG: CPT | Performed by: NURSE PRACTITIONER

## 2023-11-03 PROCEDURE — 3008F BODY MASS INDEX DOCD: CPT | Performed by: NURSE PRACTITIONER

## 2023-11-03 PROCEDURE — 3074F SYST BP LT 130 MM HG: CPT | Performed by: NURSE PRACTITIONER

## 2023-11-03 PROCEDURE — 1159F MED LIST DOCD IN RCRD: CPT | Performed by: NURSE PRACTITIONER

## 2023-11-03 PROCEDURE — 99214 OFFICE O/P EST MOD 30 MIN: CPT | Performed by: NURSE PRACTITIONER

## 2023-11-03 PROCEDURE — 1160F RVW MEDS BY RX/DR IN RCRD: CPT | Performed by: NURSE PRACTITIONER

## 2023-11-03 RX ORDER — ASPIRIN 81 MG/1
TABLET ORAL
COMMUNITY
Start: 2023-10-17

## 2023-11-03 RX ORDER — AMPICILLIN TRIHYDRATE 250 MG
CAPSULE ORAL
COMMUNITY
Start: 2023-10-16

## 2023-11-03 NOTE — PATIENT INSTRUCTIONS
Continue to push fluids and use flutter valve daily  May try mucinex 600mg daily and call if need albuterol inhaler to increase secretion clearance  Obtain CT chest in 6 months and return to review

## 2023-11-14 ENCOUNTER — APPOINTMENT (OUTPATIENT)
Dept: MRI IMAGING | Age: 68
End: 2023-11-14
Attending: EMERGENCY MEDICINE
Payer: MEDICARE

## 2023-11-14 ENCOUNTER — HOSPITAL ENCOUNTER (EMERGENCY)
Age: 68
Discharge: HOME | End: 2023-11-14
Attending: EMERGENCY MEDICINE
Payer: MEDICARE

## 2023-11-14 ENCOUNTER — APPOINTMENT (OUTPATIENT)
Dept: CT IMAGING | Age: 68
End: 2023-11-14
Attending: EMERGENCY MEDICINE
Payer: MEDICARE

## 2023-11-14 VITALS
TEMPERATURE: 98 F | OXYGEN SATURATION: 99 % | DIASTOLIC BLOOD PRESSURE: 82 MMHG | HEART RATE: 64 BPM | SYSTOLIC BLOOD PRESSURE: 148 MMHG | BODY MASS INDEX: 20.49 KG/M2 | RESPIRATION RATE: 16 BRPM | HEIGHT: 64 IN | WEIGHT: 120 LBS

## 2023-11-14 DIAGNOSIS — R20.2 PARESTHESIAS: ICD-10-CM

## 2023-11-14 DIAGNOSIS — H53.9 VISUAL DISTURBANCE: Primary | ICD-10-CM

## 2023-11-14 LAB
ALBUMIN SERPL-MCNC: 3.6 G/DL (ref 3.4–5)
ALBUMIN/GLOB SERPL: 1 {RATIO} (ref 1–2)
ALP LIVER SERPL-CCNC: 75 U/L
ALT SERPL-CCNC: 17 U/L
ANION GAP SERPL CALC-SCNC: 4 MMOL/L (ref 0–18)
AST SERPL-CCNC: 22 U/L (ref 15–37)
ATRIAL RATE: 55 BPM
BASOPHILS # BLD AUTO: 0.02 X10(3) UL (ref 0–0.2)
BASOPHILS NFR BLD AUTO: 0.4 %
BILIRUB SERPL-MCNC: 0.5 MG/DL (ref 0.1–2)
BUN BLD-MCNC: 19 MG/DL (ref 9–23)
CALCIUM BLD-MCNC: 8.9 MG/DL (ref 8.5–10.1)
CHLORIDE SERPL-SCNC: 108 MMOL/L (ref 98–112)
CO2 SERPL-SCNC: 26 MMOL/L (ref 21–32)
CREAT BLD-MCNC: 0.79 MG/DL
EGFRCR SERPLBLD CKD-EPI 2021: 82 ML/MIN/1.73M2 (ref 60–?)
EOSINOPHIL # BLD AUTO: 0.09 X10(3) UL (ref 0–0.7)
EOSINOPHIL NFR BLD AUTO: 1.8 %
ERYTHROCYTE [DISTWIDTH] IN BLOOD BY AUTOMATED COUNT: 12.5 %
GLOBULIN PLAS-MCNC: 3.5 G/DL (ref 2.8–4.4)
GLUCOSE BLD-MCNC: 93 MG/DL (ref 70–99)
HCT VFR BLD AUTO: 40 %
HGB BLD-MCNC: 13.4 G/DL
IMM GRANULOCYTES # BLD AUTO: 0.01 X10(3) UL (ref 0–1)
IMM GRANULOCYTES NFR BLD: 0.2 %
LYMPHOCYTES # BLD AUTO: 1.16 X10(3) UL (ref 1–4)
LYMPHOCYTES NFR BLD AUTO: 23.5 %
MCH RBC QN AUTO: 31.3 PG (ref 26–34)
MCHC RBC AUTO-ENTMCNC: 33.5 G/DL (ref 31–37)
MCV RBC AUTO: 93.5 FL
MONOCYTES # BLD AUTO: 0.37 X10(3) UL (ref 0.1–1)
MONOCYTES NFR BLD AUTO: 7.5 %
NEUTROPHILS # BLD AUTO: 3.28 X10 (3) UL (ref 1.5–7.7)
NEUTROPHILS # BLD AUTO: 3.28 X10(3) UL (ref 1.5–7.7)
NEUTROPHILS NFR BLD AUTO: 66.6 %
OSMOLALITY SERPL CALC.SUM OF ELEC: 288 MOSM/KG (ref 275–295)
P AXIS: 75 DEGREES
P-R INTERVAL: 168 MS
PLATELET # BLD AUTO: 202 10(3)UL (ref 150–450)
POTASSIUM SERPL-SCNC: 3.8 MMOL/L (ref 3.5–5.1)
PROT SERPL-MCNC: 7.1 G/DL (ref 6.4–8.2)
Q-T INTERVAL: 410 MS
QRS DURATION: 82 MS
QTC CALCULATION (BEZET): 392 MS
R AXIS: 30 DEGREES
RBC # BLD AUTO: 4.28 X10(6)UL
SODIUM SERPL-SCNC: 138 MMOL/L (ref 136–145)
T AXIS: 27 DEGREES
VENTRICULAR RATE: 55 BPM
WBC # BLD AUTO: 4.9 X10(3) UL (ref 4–11)

## 2023-11-14 PROCEDURE — 96360 HYDRATION IV INFUSION INIT: CPT

## 2023-11-14 PROCEDURE — 70546 MR ANGIOGRAPH HEAD W/O&W/DYE: CPT | Performed by: EMERGENCY MEDICINE

## 2023-11-14 PROCEDURE — 99284 EMERGENCY DEPT VISIT MOD MDM: CPT

## 2023-11-14 PROCEDURE — 70549 MR ANGIOGRAPH NECK W/O&W/DYE: CPT | Performed by: EMERGENCY MEDICINE

## 2023-11-14 PROCEDURE — 80053 COMPREHEN METABOLIC PANEL: CPT | Performed by: EMERGENCY MEDICINE

## 2023-11-14 PROCEDURE — 93010 ELECTROCARDIOGRAM REPORT: CPT

## 2023-11-14 PROCEDURE — A9575 INJ GADOTERATE MEGLUMI 0.1ML: HCPCS | Performed by: EMERGENCY MEDICINE

## 2023-11-14 PROCEDURE — 93005 ELECTROCARDIOGRAM TRACING: CPT

## 2023-11-14 PROCEDURE — 70450 CT HEAD/BRAIN W/O DYE: CPT | Performed by: EMERGENCY MEDICINE

## 2023-11-14 PROCEDURE — 96361 HYDRATE IV INFUSION ADD-ON: CPT

## 2023-11-14 PROCEDURE — 70553 MRI BRAIN STEM W/O & W/DYE: CPT | Performed by: EMERGENCY MEDICINE

## 2023-11-14 PROCEDURE — 85025 COMPLETE CBC W/AUTO DIFF WBC: CPT | Performed by: EMERGENCY MEDICINE

## 2023-11-14 RX ORDER — DIPHENHYDRAMINE HYDROCHLORIDE 50 MG/ML
20 INJECTION, SOLUTION INTRAMUSCULAR; INTRAVENOUS
Status: COMPLETED | OUTPATIENT
Start: 2023-11-14 | End: 2023-11-14

## 2023-11-14 RX ORDER — ASPIRIN 81 MG/1
324 TABLET, CHEWABLE ORAL ONCE
Status: COMPLETED | OUTPATIENT
Start: 2023-11-14 | End: 2023-11-14

## 2023-11-14 RX ORDER — SODIUM CHLORIDE 9 MG/ML
INJECTION, SOLUTION INTRAVENOUS CONTINUOUS
Status: DISCONTINUED | OUTPATIENT
Start: 2023-11-14 | End: 2023-11-14

## 2023-11-14 NOTE — DISCHARGE INSTRUCTIONS
Follow-up for further evaluation with neurology. Call for an appointment. Follow-up for further evaluation with your ophthalmologist.  Call for an appointment. Recommend aspirin daily. Return if new or worse symptoms.

## 2023-11-14 NOTE — ED INITIAL ASSESSMENT (HPI)
Pt states Friday she had visual changes with sx resolving. Today states she is having partial loss of vision loss to left eye with numbness to left face.

## 2023-11-20 ENCOUNTER — PATIENT OUTREACH (OUTPATIENT)
Dept: CASE MANAGEMENT | Age: 68
End: 2023-11-20

## 2023-11-20 NOTE — PROGRESS NOTES
1st attempt ER f/up apt request  [facility] -existing apt (11/16)  PCP -decline, pt will f/up w/ [facility]     Dr. Mckenzie Baum  [address] [place] [address], [address] [place] [address] parking lot   [phone]  Apt: Feb 12 @1:20pm   On wait list    Confirmed w/ pt  Closing encounter

## 2023-12-18 ENCOUNTER — TELEPHONE (OUTPATIENT)
Dept: FAMILY MEDICINE CLINIC | Facility: CLINIC | Age: 68
End: 2023-12-18

## 2023-12-18 ENCOUNTER — MED REC SCAN ONLY (OUTPATIENT)
Dept: FAMILY MEDICINE CLINIC | Facility: CLINIC | Age: 68
End: 2023-12-18

## 2024-01-25 ENCOUNTER — TELEPHONE (OUTPATIENT)
Dept: FAMILY MEDICINE CLINIC | Facility: CLINIC | Age: 69
End: 2024-01-25

## 2024-01-25 DIAGNOSIS — Z12.11 SCREEN FOR COLON CANCER: Primary | ICD-10-CM

## 2024-01-25 NOTE — TELEPHONE ENCOUNTER
Pt received text message from Mamadou stating she's due for a Colonoscopy. Pt would like to know who Dr. Baca recommends and would like a referral placed.    [facility].    Please place referral. Thank you.

## 2024-02-12 ENCOUNTER — OFFICE VISIT (OUTPATIENT)
Dept: NEUROLOGY | Facility: CLINIC | Age: 69
End: 2024-02-12
Payer: COMMERCIAL

## 2024-02-12 VITALS — DIASTOLIC BLOOD PRESSURE: 70 MMHG | HEART RATE: 70 BPM | RESPIRATION RATE: 16 BRPM | SYSTOLIC BLOOD PRESSURE: 118 MMHG

## 2024-02-12 DIAGNOSIS — H53.9 VISUAL CHANGES: Primary | ICD-10-CM

## 2024-02-12 DIAGNOSIS — R20.2 FACIAL PARESTHESIA: ICD-10-CM

## 2024-02-12 PROCEDURE — 1159F MED LIST DOCD IN RCRD: CPT | Performed by: OTHER

## 2024-02-12 PROCEDURE — 3074F SYST BP LT 130 MM HG: CPT | Performed by: OTHER

## 2024-02-12 PROCEDURE — 99204 OFFICE O/P NEW MOD 45 MIN: CPT | Performed by: OTHER

## 2024-02-12 PROCEDURE — 3078F DIAST BP <80 MM HG: CPT | Performed by: OTHER

## 2024-02-12 RX ORDER — TRIAMCINOLONE ACETONIDE 1 MG/G
CREAM TOPICAL AS NEEDED
COMMUNITY
Start: 2023-12-14

## 2024-02-12 RX ORDER — PYRITHIONE ZINC 1 G/ML
LOTION/SHAMPOO TOPICAL
COMMUNITY

## 2024-02-12 NOTE — PROGRESS NOTES
Patient here for evaluation of episode of dizziness, vision disturbance and numbness on left side of face. Was in ER for this episode in 11/2023. Has had 2 total episodes

## 2024-02-12 NOTE — PROGRESS NOTES
[facility]    Date of consult: 2/12/2024    Assessment:    ICD-10-CM    1. Visual changes  H53.9 EEG (At [facility])     2. Facial paresthesia  R20.2       ? Episodic migraine vs TIA    Plan:  ASA 81 mg  Cardiologist to follow re; further cardiac work up  EEG  Reviewed MRI, MRA  Migraine education given  See orders and medications filed with this encounter. The patient indicates understanding of these issues and agrees with the plan.  Discussed with patient regarding assessment, work up, care plan   RTC 6 months  Pt should go ER for any new or worsening symptoms and contact office     Subjective:   CC/Reason for consult:  visual change, numbness  Consult Requested by RAFFAELE CRUM MD    HPI: Britt Erwin is a 68 year old female with past medical history as listed below presents here for initial evaluation of episode of dizziness, vision disturbance and numbness on left side of face. Was in ER for this episode in 11/2023. Has had 2 total episodes (1st about one year ago before last nov episode). Denies history of migraine prior, daughter has migraine. Her MRI at ER was negative for acute change.     History/Other:   REVIEW OF SYSTEMS:  A comprehensive 14-point system was reviewed. Pertinent positives and negatives are noted in HPI.       Current Outpatient Medications:     Metamucil Fiber Oral Chew Tab, Chew by mouth. Gummy 1 per day, Disp: , Rfl:     Fluticasone Propionate (FLONASE NA), by Nasal route as needed., Disp: , Rfl:     triamcinolone 0.1 % External Cream, as needed., Disp: , Rfl:     aspirin 81 MG Oral Tab EC, aspirin 81 mg tablet,delayed release, [RxNorm: 105630], Disp: , Rfl:     Red Yeast Rice 600 MG Oral Cap, , Disp: , Rfl:     betamethasone dipropionate 0.05 % External Cream, as needed., Disp: , Rfl:     Calcium Carb-Cholecalciferol (CALTRATE 600+D3 OR), Take 1 tablet by mouth daily., Disp: , Rfl:     metRONIDAZOLE 0.75 % External Cream, Apply topically as  needed., Disp: , Rfl: 11    Probiotic Product (PROBIOTIC-10 OR), Take by mouth., Disp: , Rfl:     Multiple Vitamins-Minerals (MULTIVITAL) Oral Chew Tab, Chew by mouth., Disp: , Rfl:     mupirocin 2 % External Ointment, Apply topically daily as needed., Disp: , Rfl: 2    ezetimibe 10 MG Oral Tab, Take 1 tablet (10 mg total) by mouth daily. (Patient not taking: Reported on 2/12/2024), Disp: , Rfl:   Allergies:  Allergies   Allergen Reactions    Nickel SWELLING     Swelling rash on skin    Statins MYALGIA     Past Medical History:   Diagnosis Date    Allergic rhinitis 2000    Calculus of kidney 2005    Carotid stenosis 2020    Hx of skin cancer, basal cell 2016    on right temple    Migraines 2022    Mondor's disease     BREAST    Osteopenia     Visual impairment     glasses     Past Surgical History:   Procedure Laterality Date    BUNIONECTOMY Right 02/2022    CATARACT Left 01/10/2023    CATARACT EXTRACTION Right 12/27/2022    COLONOSCOPY  11/2013    normal; repeat 5 yrs (hx of polyps)    COLONOSCOPY  11/22/2013    Procedure: COLONOSCOPY;  Surgeon: Amari Meza MD;  Location:  ENDOSCOPY    COLONOSCOPY  01/2019    normal- repeat 5 yrs    COLONOSCOPY N/A 01/23/2019    Procedure: COLONOSCOPY, POSSIBLE BIOPSY, POSSIBLE POLYPECTOMY 38024;  Surgeon: Amari Meza MD;  Location: [facility]    CORRECT BUNION,OTHR METHODS Left     ALVARADO LOCALIZATION WIRE 1 SITE LEFT (CPT=19281)  1992 ?     benign    OTHER SURGICAL HISTORY      bilateral foot sx    OTHER SURGICAL HISTORY  2009    left carpal tunnel    OTHER SURGICAL HISTORY  1996    ganglion cyst right wrist    OTHER SURGICAL HISTORY  2003    lithotrypsy    OTHER SURGICAL HISTORY      vein surgery     OTHER SURGICAL HISTORY  2016    oral reduction internal fixation RT FOOT 5/2016    REMOVAL OF KIDNEY STONE  01/24/2022    SKIN SURGERY Right 11/03/2016    MOHS      Social History:  Social History     Tobacco Use    Smoking status: Former     Packs/day: 1.00      Years: 30.00     Additional pack years: 0.00     Total pack years: 30.00     Types: Cigarettes     Quit date: 2007     Years since quittin.2    Smokeless tobacco: Never   Substance Use Topics    Alcohol use: Yes     Alcohol/week: 3.0 standard drinks of alcohol     Types: 3 Cans of beer per week     Comment: 4 beers weekly     Family History   Problem Relation Age of Onset    Cancer Mother 75        Lung cancer with mets to liver and spine    Hypertension Mother     Heart Disorder Brother     Heart Disorder Father     Cancer Sister 64        Multiple myeloma    Depression Sister      Objective:   Physical Examination:  /70   Pulse 70   Resp 16   LMP 2010   General: Awake and alert; in no acute distress  HEENT: Eye sclerae are anicteric; scalp is atraumatic  Neck: Supple  Cardiac: Regular rate and regular rhythm  Lungs: Clear   Abdomen:  non-tender  Extremities: No clubbing or cyanosis; moves extremities   Psychiatric: Normal mood and affect; answers questions appropriately  Dermatologic: No rashes; no edema  Neurological Examination:  Language: normal   Speech: no dysarthria  CN: II-XII intact  Motor strength: 5/5 all extremities  Tone: normal  DTRs: 2+ symmetric  Coordination: Normal FTN  Sensory: symmetric   Gait: nl    Data Reviewed on 2024  Notes Reviewed on 2024  Labs Reviewed  on 2024    Angel Gonzales MD (Michael)   Neurology  [facility]  2024, 1:59 PM  Consultation Report: being sent/fax/route to requesting provider   CC: RAFFAELE CRUM MD

## 2024-02-20 ENCOUNTER — OFFICE VISIT (OUTPATIENT)
Dept: FAMILY MEDICINE CLINIC | Facility: CLINIC | Age: 69
End: 2024-02-20
Payer: COMMERCIAL

## 2024-02-20 VITALS
WEIGHT: 118.81 LBS | HEIGHT: 63 IN | OXYGEN SATURATION: 97 % | RESPIRATION RATE: 16 BRPM | DIASTOLIC BLOOD PRESSURE: 72 MMHG | HEART RATE: 63 BPM | BODY MASS INDEX: 21.05 KG/M2 | SYSTOLIC BLOOD PRESSURE: 128 MMHG | TEMPERATURE: 97 F

## 2024-02-20 DIAGNOSIS — Z12.31 ENCOUNTER FOR SCREENING MAMMOGRAM FOR MALIGNANT NEOPLASM OF BREAST: ICD-10-CM

## 2024-02-20 DIAGNOSIS — T46.6X5A MYALGIA DUE TO STATIN: ICD-10-CM

## 2024-02-20 DIAGNOSIS — E78.2 MIXED HYPERLIPIDEMIA: ICD-10-CM

## 2024-02-20 DIAGNOSIS — R91.1 PULMONARY NODULE: ICD-10-CM

## 2024-02-20 DIAGNOSIS — N20.0 KIDNEY STONES: ICD-10-CM

## 2024-02-20 DIAGNOSIS — M79.10 MYALGIA DUE TO STATIN: ICD-10-CM

## 2024-02-20 DIAGNOSIS — Z00.00 ENCOUNTER FOR ANNUAL HEALTH EXAMINATION: Primary | ICD-10-CM

## 2024-02-20 DIAGNOSIS — E46 PROTEIN-CALORIE MALNUTRITION, UNSPECIFIED SEVERITY (HCC): ICD-10-CM

## 2024-02-20 DIAGNOSIS — M25.551 RIGHT HIP PAIN: ICD-10-CM

## 2024-02-20 DIAGNOSIS — Z12.11 COLON CANCER SCREENING: ICD-10-CM

## 2024-02-20 PROBLEM — J41.0 SMOKERS' COUGH (HCC): Chronic | Status: ACTIVE | Noted: 2024-02-20

## 2024-02-20 RX ORDER — FLUTICASONE PROPIONATE 50 MCG
SPRAY, SUSPENSION (ML) NASAL
COMMUNITY
Start: 2024-02-14

## 2024-02-20 NOTE — PATIENT INSTRUCTIONS
Start exercises for hip    Call insurance about coverage for xray  If want to get it done at future, let us know    Schedule xray and mammogram    Go to [facility] for fasting bloodwork    Followup with neurology, pulmonary and cardiology as planned    Followup with me in 1 yr, sooner if pain not improving  Britt Erwin's SCREENING SCHEDULE   Tests on this list are recommended by your physician but may not be covered, or covered at this frequency, by your insurer.   Please check with your insurance carrier before scheduling to verify coverage.   PREVENTATIVE SERVICES FREQUENCY &  COVERAGE DETAILS LAST COMPLETION DATE   Diabetes Screening    Fasting Blood Sugar /  Glucose    One screening every 12 months if never tested or if previously tested but not diagnosed with pre-diabetes   One screening every 6 months if diagnosed with pre-diabetes Lab Results   Component Value Date    GLU 93 11/14/2023        Cardiovascular Disease Screening    Lipid Panel  Cholesterol  Lipoprotein (HDL)  Triglycerides Covered every 5 years for all Medicare beneficiaries without apparent signs or symptoms of cardiovascular disease Lab Results   Component Value Date    CHOLEST 184 03/06/2023    HDL 87 03/06/2023    LDL 79 03/06/2023    LDLD 97 07/26/2022    TRIG 95 03/06/2023         Electrocardiogram (EKG)   Covered if needed at Welcome to Medicare, and non-screening if indicated for medical reasons 11/14/2023      Ultrasound Screening for Abdominal Aortic Aneurysm (AAA) Covered once in a lifetime for one of the following risk factors   • Men who are 65-75 years old and have ever smoked   • Anyone with a family history -     Colorectal Cancer Screening  Covered for ages 50-85; only need ONE of the following:    Colonoscopy   Covered every 10 years    Covered every 2 years if patient is at high risk or previous colonoscopy was abnormal 01/23/2019    Health Maintenance   Topic Date Due   • Colorectal Cancer Screening  01/23/2024       Flexible  Sigmoidoscopy   Covered every 4 years -    Fecal Occult Blood Test Covered annually -   Bone Density Screening    Bone density screening    Covered every 2 years after age 65 if diagnosed with risk of osteoporosis or estrogen deficiency.    Covered yearly for long-term glucocorticoid medication use (Steroids) Last Dexa Scan:    XR DEXA BONE DENSITOMETRY (CPT=77080) 03/03/2023      No recommendations at this time   Pap and Pelvic    Pap   Covered every 2 years for women at normal risk; Annually if at high risk 01/18/2021  Health Maintenance   Topic Date Due   • Pap Smear  01/18/2026       Chlamydia Annually if high risk -  No recommendations at this time   Screening Mammogram    Mammogram     Recommend annually for all female patients aged 40 and older    One baseline mammogram covered for patients aged 35-39 03/03/2023    Health Maintenance   Topic Date Due   • Mammogram  03/03/2024       Immunizations    Influenza Covered once per flu season  Please get every year 10/08/2023  No recommendations at this time    Pneumococcal Each vaccine ([id number] & [id number]) covered once after 65 Prevnar 13: 01/18/2021    [id number]: 02/10/2022     No recommendations at this time    Hepatitis B One screening covered for patients with certain risk factors   -  No recommendations at this time    Tetanus Toxoid Not covered by Medicare Part B unless medically necessary (cut with metal); may be covered with your pharmacy prescription benefits -    Tetanus, Diptheria and Pertusis TD and TDaP Not covered by Medicare Part B -  No recommendations at this time    Zoster Not covered by Medicare Part B; may be covered with your pharmacy  prescription benefits -  No recommendations at this time     Chronic Obstructive Pulmonary Disease (COPD)    Spirometry Annually Spirometry date:

## 2024-02-20 NOTE — PROGRESS NOTES
Subjective:   Britt Erwin is a 68 year old female who presents for a MA (Medicare Advantage) Supervisit (Once per calendar year).     1. Encounter for annual health examination  2. Encounter for screening mammogram for malignant neoplasm of breast  3. Colon cancer screening  -due for wellness    4. Pulmonary nodule  -continues to be monitored by pulm  -last CT scan was 3 months ago  -has upcoming appt with pulm in 2 months    5. Mixed hyperlipidemia  6. Myalgia due to statin  -did not tolerate statin  -on red rice yeast currently  -due for labs    7. Kidney stones  -no new stones    8. Protein-calorie malnutrition, unspecified severity (HCC)  -trying to eat regularly  -has cut out granola bars  -down about 2 lbs since last appt    9. Right hip pain  -complains of right hip pain for past several months  -no known injury  -starts in posterior aspect and radiates laterally or to the groin  -worse with bending or twisting        History/Other:   Fall Risk Assessment:   She has been screened for Falls and is low risk.      Cognitive Assessment:   She had a completely normal cognitive assessment - see flowsheet entries     Functional Ability/Status:   Britt Erwin has a completely normal functional assessment. See flowsheet for details.        Depression Screening (PHQ-2/PHQ-9): PHQ-2 SCORE: 0  , done 2/20/2024   If you checked off any problems, how difficult have these problems made it for you to do your work, take care of things at home, or get along with other people?: Not difficult at all    Last [place] Suicide Screening on 2/20/2024 was No Risk.     Advanced Directives:   She does NOT have a Living Will. [Do you have a living will?: No]  She does NOT have a Power of  for Health Care. [Do you have a healthcare power of ?: No]  Discussed Advance Care Planning with patient (and family/surrogate if present). Standard forms made available to patient in After Visit Summary.      Patient  Active Problem List   Diagnosis    Closed fracture of one or more phalanges of foot    Protein-calorie malnutrition, unspecified severity (HCC)    Myalgia due to statin    Facial paresthesia    Smokers' cough (HCC)     Allergies:  She is allergic to nickel and statins.    Current Medications:  Outpatient Medications Marked as Taking for the 2/20/24 encounter (Office Visit) with Prasanth Baca MD   Medication Sig    fluticasone propionate 50 MCG/ACT Nasal Suspension USE 2 SPRAYS IN EACH NOSTRIL DAILY. SPRAY LATERALLY TO AVOID THE NASAL SEPTUM.    Metamucil Fiber Oral Chew Tab Chew by mouth. Gummy 1 per day    triamcinolone 0.1 % External Cream as needed.    aspirin 81 MG Oral Tab EC aspirin 81 mg tablet,delayed release, [RxNorm: 457013]    Red Yeast Rice 600 MG Oral Cap     betamethasone dipropionate 0.05 % External Cream as needed.    Calcium Carb-Cholecalciferol (CALTRATE 600+D3 OR) Take 1 tablet by mouth daily.    metRONIDAZOLE 0.75 % External Cream Apply topically as needed.    Probiotic Product (PROBIOTIC-10 OR) Take by mouth.    Multiple Vitamins-Minerals (MULTIVITAL) Oral Chew Tab Chew by mouth.    mupirocin 2 % External Ointment Apply topically daily as needed.       Medical History:  She  has a past medical history of Allergic rhinitis (2000), Atypical mole, Body piercing, Calculus of kidney (2005), Carotid stenosis (2020), Decorative tattoo, Flatulence/gas pain/belching, skin cancer, basal cell (2016), Migraines (2022), Mondor's disease, Osteopenia, Visual impairment, and Wears glasses.  Surgical History:  She  has a past surgical history that includes colonoscopy (11/2013); colonoscopy (11/22/2013); devon localization wire 1 site left (cpt=19281) (1992 ? ); other surgical history; other surgical history (2009); other surgical history (1996); other surgical history (2003); other surgical history; other surgical history (2016); skin surgery (Right, 11/03/2016); colonoscopy (01/2019); colonoscopy (N/A,  2019); correct bunion,othr methods (Left); removal of kidney stone (2022); cataract (Left, 01/10/2023); Bunionectomy (Right, 2022); Cataract extraction (Right, 2022); and egd.   Family History:  Her family history includes Cancer (age of onset: 64) in her sister; Cancer (age of onset: 75) in her mother; Colon Cancer in her self; Depression in her sister; Heart Attack in her father; Heart Disorder in her brother and father; Hypertension in her mother; Stroke in her father.  Social History:  She  reports that she quit smoking about 16 years ago. Her smoking use included cigarettes. She has a 30 pack-year smoking history. She has never used smokeless tobacco. She reports current alcohol use of about 4.0 standard drinks of alcohol per week. She reports that she does not use drugs.    Tobacco:  She smoked tobacco in the past but quit greater than 12 months ago.  Social History    Tobacco Use      Smoking status: Former        Packs/day: 1.00        Years: 30.00        Additional pack years: 0.00        Total pack years: 30.00        Types: Cigarettes        Quit date: 2007        Years since quittin.2      Smokeless tobacco: Never         CAGE Alcohol Screen:   CAGE screening score of 0 on 2024, showing low risk of alcohol abuse.      Patient Care Team:  Prasanth Baca MD as PCP - General (Family Medicine)  Angel Gonzales MD as Consulting Physician (NEUROLOGY)    Review of Systems     Negative except above    Objective:   Physical Exam    General Appearance:  Alert, cooperative, no distress, appears stated age   Head:  Normocephalic, without obvious abnormality, atraumatic   Eyes:  PERRL   Ears:  Normal TM's and external ear canals, both ears   Lungs:   Clear to auscultation bilaterally, respirations unlabored   Heart:  Regular rate and rhythm   Extremities: Extremities normal, no cyanosis or edema   Skin: Skin color, texture, turgor normal   Neurologic: No appreciable abnormality        /72   Pulse 63   Temp 97.3 °F (36.3 °C) (Temporal)   Resp 16   Ht 5' 3\" (1.6 m)   Wt 118 lb 12.8 oz (53.9 kg)   LMP 12/11/2010   SpO2 97%   BMI 21.04 kg/m²  Estimated body mass index is 21.04 kg/m² as calculated from the following:    Height as of this encounter: 5' 3\" (1.6 m).    Weight as of this encounter: 118 lb 12.8 oz (53.9 kg).    Medicare Hearing Assessment:  Hearing Screening    Screening Method: Finger Rub  Finger Rub Result: Pass                   Assessment & Plan:   Britt Erwin is a 68 year old female who presents for a Medicare Assessment.     1. Encounter for annual health examination  -reviewed age appropriate screening    2. Encounter for screening mammogram for malignant neoplasm of breast  - [facility] RINA 2D+3D SCREENING BILAT (CPT=77067/27137); Future    3. Colon cancer screening  -scheduled next month    4. Pulmonary nodule  -continues to be monitored closely  -f/u with pulm in 2 months as planned    5. Mixed hyperlipidemia  6. Myalgia due to statin  -c/w red rice yeast  -check labs  -f/u with cardiology as planned    7. Kidney stones  -stable without recurrence - encouraged hydration    8. Protein-calorie malnutrition, unspecified severity (HCC)  -stable   -will continue to monitor    9. Right hip pain  -suspect arthric vs ligamentous  -check xrays  -start home exercises  -f/u in 4-6 wks if not improving    - XR HIP + PELVIS MIN 4 VIEWS RIGHT (CPT=73503); Future      The patient indicates understanding of these issues and agrees to the plan.  Reinforced healthy diet, lifestyle, and exercise.      Return in about 1 year (around 2/20/2025).     RAFFAELE CRUM MD, 2/10/2022     Supplementary Documentation:   General Health:  In the past six months, have you lost more than 10 pounds without trying?: 2 - No  Has your appetite been poor?: No  Type of Diet: Vegetarian  How does the patient maintain a good energy level?: Appropriate Exercise;Daily Walks;Stretching;Other  How would  you describe your daily physical activity?: Moderate  How would you describe your current health state?: Good  How do you maintain positive mental well-being?: Social Interaction;Puzzles;Games;Visiting Friends;Visiting Family  On a scale of 0 to 10, with 0 being no pain and 10 being severe pain, what is your pain level?: 0 - (None)  In the past six months, have you experienced urine leakage?: 0-No  At any time do you feel concerned for the safety/well-being of yourself and/or your children, in your home or elsewhere?: No  Have you had any immunizations at another office such as Influenza, Hepatitis B, Tetanus, or Pneumococcal?: Yes       Britt Erwin's SCREENING SCHEDULE   Tests on this list are recommended by your physician but may not be covered, or covered at this frequency, by your insurer.   Please check with your insurance carrier before scheduling to verify coverage.   PREVENTATIVE SERVICES FREQUENCY &  COVERAGE DETAILS LAST COMPLETION DATE   Diabetes Screening    Fasting Blood Sugar /  Glucose    One screening every 12 months if never tested or if previously tested but not diagnosed with pre-diabetes   One screening every 6 months if diagnosed with pre-diabetes Lab Results   Component Value Date    GLU 93 11/14/2023        Cardiovascular Disease Screening    Lipid Panel  Cholesterol  Lipoprotein (HDL)  Triglycerides Covered every 5 years for all Medicare beneficiaries without apparent signs or symptoms of cardiovascular disease Lab Results   Component Value Date    CHOLEST 184 03/06/2023    HDL 87 03/06/2023    LDL 79 03/06/2023    LDLD 97 07/26/2022    TRIG 95 03/06/2023         Electrocardiogram (EKG)   Covered if needed at Welcome to Medicare, and non-screening if indicated for medical reasons 11/14/2023      Ultrasound Screening for Abdominal Aortic Aneurysm (AAA) Covered once in a lifetime for one of the following risk factors    Men who are 65-75 years old and have ever smoked    Anyone with a  family history -     Colorectal Cancer Screening  Covered for ages 50-85; only need ONE of the following:    Colonoscopy   Covered every 10 years    Covered every 2 years if patient is at high risk or previous colonoscopy was abnormal 01/23/2019    Health Maintenance   Topic Date Due    Colorectal Cancer Screening  01/23/2024       Flexible Sigmoidoscopy   Covered every 4 years -    Fecal Occult Blood Test Covered annually -   Bone Density Screening    Bone density screening    Covered every 2 years after age 65 if diagnosed with risk of osteoporosis or estrogen deficiency.    Covered yearly for long-term glucocorticoid medication use (Steroids) Last Dexa Scan:    XR DEXA BONE DENSITOMETRY (CPT=77080) 03/03/2023      No recommendations at this time   Pap and Pelvic    Pap   Covered every 2 years for women at normal risk; Annually if at high risk 01/18/2021  Health Maintenance   Topic Date Due    Pap Smear  01/18/2026       Chlamydia Annually if high risk -  No recommendations at this time   Screening Mammogram    Mammogram     Recommend annually for all female patients aged 40 and older    One baseline mammogram covered for patients aged 35-39 03/03/2023    Health Maintenance   Topic Date Due    Mammogram  03/03/2024       Immunizations    Influenza Covered once per flu season  Please get every year 10/08/2023  No recommendations at this time    Pneumococcal Each vaccine ([id number] & [id number]) covered once after 65 Prevnar 13: 01/18/2021    [id number]: 02/10/2022     No recommendations at this time    Hepatitis B One screening covered for patients with certain risk factors   -  No recommendations at this time    Tetanus Toxoid Not covered by Medicare Part B unless medically necessary (cut with metal); may be covered with your pharmacy prescription benefits -    Tetanus, Diptheria and Pertusis TD and TDaP Not covered by Medicare Part B -  No recommendations at this time    Zoster Not covered by Medicare Part B;  may be covered with your pharmacy  prescription benefits -  No recommendations at this time     Chronic Obstructive Pulmonary Disease (COPD)    Spirometry Annually Spirometry date:              Overall 60 minutes was spent on this encounter, 40 mins spent reviewing and providing care coordination for these conditions: nodule, lipid, stones, weight, hip pain  20  minutes was spent reviewing wellness elements and providing age appropriate screenings.

## 2024-02-28 LAB
ABSOLUTE BASOPHILS: 22 CELLS/UL (ref 0–200)
ABSOLUTE EOSINOPHILS: 112 CELLS/UL (ref 15–500)
ABSOLUTE LYMPHOCYTES: 911 CELLS/UL (ref 850–3900)
ABSOLUTE MONOCYTES: 292 CELLS/UL (ref 200–950)
ABSOLUTE NEUTROPHILS: 2264 CELLS/UL (ref 1500–7800)
ALBUMIN/GLOBULIN RATIO: 1.8 (CALC) (ref 1–2.5)
ALBUMIN: 4.2 G/DL (ref 3.6–5.1)
ALKALINE PHOSPHATASE: 67 U/L (ref 37–153)
ALT: 12 U/L (ref 6–29)
AST: 20 U/L (ref 10–35)
BASOPHILS: 0.6 %
BILIRUBIN, TOTAL: 0.6 MG/DL (ref 0.2–1.2)
BUN: 18 MG/DL (ref 7–25)
CALCIUM: 9.4 MG/DL (ref 8.6–10.4)
CARBON DIOXIDE: 28 MMOL/L (ref 20–32)
CHLORIDE: 107 MMOL/L (ref 98–110)
CHOL/HDLC RATIO: 2.3 (CALC)
CHOLESTEROL, TOTAL: 220 MG/DL
CREATININE: 0.87 MG/DL (ref 0.5–1.05)
EGFR: 73 ML/MIN/1.73M2
EOSINOPHILS: 3.1 %
GLOBULIN: 2.3 G/DL (CALC) (ref 1.9–3.7)
GLUCOSE: 78 MG/DL (ref 65–99)
HDL CHOLESTEROL: 95 MG/DL
HEMATOCRIT: 41.3 % (ref 35–45)
HEMOGLOBIN: 13.5 G/DL (ref 11.7–15.5)
LDL-CHOLESTEROL: 109 MG/DL (CALC)
LYMPHOCYTES: 25.3 %
MCH: 30.4 PG (ref 27–33)
MCHC: 32.7 G/DL (ref 32–36)
MCV: 93 FL (ref 80–100)
MONOCYTES: 8.1 %
MPV: 10.5 FL (ref 7.5–12.5)
NEUTROPHILS: 62.9 %
NON-HDL CHOLESTEROL: 125 MG/DL (CALC)
PLATELET COUNT: 211 THOUSAND/UL (ref 140–400)
POTASSIUM: 4 MMOL/L (ref 3.5–5.3)
PROTEIN, TOTAL: 6.5 G/DL (ref 6.1–8.1)
RDW: 12.5 % (ref 11–15)
RED BLOOD CELL COUNT: 4.44 MILLION/UL (ref 3.8–5.1)
SODIUM: 142 MMOL/L (ref 135–146)
TRIGLYCERIDES: 70 MG/DL
TSH W/REFLEX TO FT4: 2.85 MIU/L (ref 0.4–4.5)
WHITE BLOOD CELL COUNT: 3.6 THOUSAND/UL (ref 3.8–10.8)

## 2024-03-09 ENCOUNTER — HOSPITAL ENCOUNTER (OUTPATIENT)
Dept: MAMMOGRAPHY | Age: 69
Discharge: HOME | End: 2024-03-09
Attending: FAMILY MEDICINE
Payer: MEDICARE

## 2024-03-09 ENCOUNTER — HOSPITAL ENCOUNTER (OUTPATIENT)
Dept: GENERAL RADIOLOGY | Age: 69
Discharge: HOME | End: 2024-03-09
Attending: FAMILY MEDICINE
Payer: MEDICARE

## 2024-03-09 DIAGNOSIS — Z12.31 ENCOUNTER FOR SCREENING MAMMOGRAM FOR MALIGNANT NEOPLASM OF BREAST: ICD-10-CM

## 2024-03-09 DIAGNOSIS — M25.551 RIGHT HIP PAIN: ICD-10-CM

## 2024-03-09 PROCEDURE — 73502 X-RAY EXAM HIP UNI 2-3 VIEWS: CPT | Performed by: FAMILY MEDICINE

## 2024-03-09 PROCEDURE — 77063 BREAST TOMOSYNTHESIS BI: CPT | Performed by: FAMILY MEDICINE

## 2024-03-09 PROCEDURE — 77067 SCR MAMMO BI INCL CAD: CPT | Performed by: FAMILY MEDICINE

## 2024-03-18 PROBLEM — Z86.0100 PERSONAL HISTORY OF COLONIC POLYPS: Status: ACTIVE | Noted: 2024-03-18

## 2024-03-18 PROBLEM — Z86.010 PERSONAL HISTORY OF COLONIC POLYPS: Status: ACTIVE | Noted: 2024-03-18

## 2024-04-12 ENCOUNTER — MED REC SCAN ONLY (OUTPATIENT)
Facility: CLINIC | Age: 69
End: 2024-04-12

## 2024-04-16 ENCOUNTER — TELEPHONE (OUTPATIENT)
Facility: CLINIC | Age: 69
End: 2024-04-16

## 2024-04-16 NOTE — TELEPHONE ENCOUNTER
Received fax from [facility] with patients CT chest results.   Placed in bin for [address] for  monica GANNON to review and advise.   MCM sent to patient to inform of the above.

## 2024-04-17 NOTE — TELEPHONE ENCOUNTER
Yes. I have received them now. Scheduled for followup with Dr Abbasi to review in the next few weeks

## 2024-04-18 NOTE — TELEPHONE ENCOUNTER
Results reviewed per Estela GANNON.  Patient has an appointment upcoming 04/26/24 with Dr Abbasi.  Closing encounter.

## 2024-04-26 ENCOUNTER — OFFICE VISIT (OUTPATIENT)
Facility: CLINIC | Age: 69
End: 2024-04-26
Payer: COMMERCIAL

## 2024-04-26 ENCOUNTER — TELEPHONE (OUTPATIENT)
Facility: CLINIC | Age: 69
End: 2024-04-26

## 2024-04-26 VITALS
DIASTOLIC BLOOD PRESSURE: 64 MMHG | HEIGHT: 63 IN | SYSTOLIC BLOOD PRESSURE: 110 MMHG | BODY MASS INDEX: 21.09 KG/M2 | OXYGEN SATURATION: 94 % | RESPIRATION RATE: 16 BRPM | HEART RATE: 68 BPM | WEIGHT: 119 LBS

## 2024-04-26 DIAGNOSIS — R91.8 PULMONARY NODULES/LESIONS, MULTIPLE: Primary | ICD-10-CM

## 2024-04-26 DIAGNOSIS — R05.3 CHRONIC COUGH: ICD-10-CM

## 2024-04-26 PROCEDURE — 3008F BODY MASS INDEX DOCD: CPT | Performed by: INTERNAL MEDICINE

## 2024-04-26 PROCEDURE — 3074F SYST BP LT 130 MM HG: CPT | Performed by: INTERNAL MEDICINE

## 2024-04-26 PROCEDURE — 1159F MED LIST DOCD IN RCRD: CPT | Performed by: INTERNAL MEDICINE

## 2024-04-26 PROCEDURE — 3078F DIAST BP <80 MM HG: CPT | Performed by: INTERNAL MEDICINE

## 2024-04-26 PROCEDURE — 1160F RVW MEDS BY RX/DR IN RCRD: CPT | Performed by: INTERNAL MEDICINE

## 2024-04-26 PROCEDURE — 99214 OFFICE O/P EST MOD 30 MIN: CPT | Performed by: INTERNAL MEDICINE

## 2024-04-26 RX ORDER — IPRATROPIUM BROMIDE 42 UG/1
1 SPRAY, METERED NASAL 2 TIMES DAILY
Qty: 15 ML | Refills: 4 | Status: SHIPPED | OUTPATIENT
Start: 2024-04-26

## 2024-04-26 NOTE — PROGRESS NOTES
[facility] Pulmonary Follow Up Note    Chief Complaint:  Chief Complaint   Patient presents with    Follow - Up     Review CT        History of Present Illness:  Britt Erwin is a 68 year old female who presents today for follow up of shortness of breath and cough.    Interval history:  Since last visit, patient had repeat CT, which appears to be stable compared to previous ones.  She also reports a cough, which is slightly better.  She does notice that she does get a cough after food.      Past Medical History:   Past Medical History:    Allergic rhinitis    Atypical mole    Body piercing    Calculus of kidney    Carotid stenosis    Decorative tattoo    Flatulence/gas pain/belching    Hx of skin cancer, basal cell    on right temple    Migraines    Mondor's disease    BREAST    Osteopenia    Visual impairment    glasses    Wears glasses        Past Surgical History:   Past Surgical History:   Procedure Laterality Date    Bunionectomy Right 02/2022    Cataract Left 01/10/2023    Cataract extraction Right 12/27/2022    Colonoscopy  11/2013    normal; repeat 5 yrs (hx of polyps)    Colonoscopy  11/22/2013    Procedure: COLONOSCOPY;  Surgeon: Amari Meza MD;  Location:  ENDOSCOPY    Colonoscopy  01/2019    normal- repeat 5 yrs    Colonoscopy N/A 01/23/2019    Procedure: COLONOSCOPY, POSSIBLE BIOPSY, POSSIBLE POLYPECTOMY 87223;  Surgeon: Amari Meza MD;  Location: [facility]    Correct bunion,othr methods Left     [facility]      Ney localization wire 1 site left (cpt=19281)  1992 ?     benign    Other surgical history      bilateral foot sx    Other surgical history  2009    left carpal tunnel    Other surgical history  1996    ganglion cyst right wrist    Other surgical history  2003    lithotrypsy    Other surgical history      vein surgery     Other surgical history  2016    oral reduction internal fixation RT FOOT 5/2016    Removal of kidney stone  01/24/2022    Skin surgery Right 11/03/2016    [facility]         Family Medical History:   Family History   Problem Relation Age of Onset    Stroke Father     Heart Attack Father     Heart Disorder Father     Cancer Mother 75        Lung cancer with mets to liver and spine    Hypertension Mother     Cancer Sister 64        Multiple myeloma    Depression Sister     Heart Disorder Brother     Colon Cancer Self         Social History:   Social History     Socioeconomic History    Marital status:      Spouse name: Not on file    Number of children: Not on file    Years of education: Not on file    Highest education level: Not on file   Occupational History    Not on file   Tobacco Use    Smoking status: Former     Current packs/day: 0.00     Average packs/day: 1 pack/day for 30.0 years (30.0 ttl pk-yrs)     Types: Cigarettes     Start date: 1977     Quit date: 2007     Years since quittin.4    Smokeless tobacco: Never   Vaping Use    Vaping status: Never Used   Substance and Sexual Activity    Alcohol use: Yes     Alcohol/week: 3.0 standard drinks of alcohol     Types: 3 Cans of beer per week     Comment: 4 beers weekly    Drug use: Never    Sexual activity: Not on file   Other Topics Concern    Caffeine Concern No    Exercise Yes     Comment: 4x per week    Seat Belt Yes    Special Diet No    Stress Concern No    Weight Concern Yes     Service Not Asked    Blood Transfusions Not Asked    Occupational Exposure Not Asked    Hobby Hazards Not Asked    Sleep Concern Not Asked    Back Care Not Asked    Bike Helmet Not Asked    Self-Exams Not Asked   Social History Narrative    Not on file     Social Determinants of Health     Financial Resource Strain: Not on file   Food Insecurity: Not on file   Transportation Needs: Not on file   Physical Activity: Sufficiently Active (3/22/2021)    [facility] from [facility]    Exercise Vital Sign     Days of Exercise per Week: 5 days     Minutes of Exercise per Session: 30 min   Stress: Not on file    Social Connections: Not on file   Housing Stability: Not on file        Medications:   Current Outpatient Medications   Medication Sig Dispense Refill    ipratropium 0.06 % Nasal Solution 1 spray by Nasal route in the morning and 1 spray before bedtime. 1 sprays in each nostril nightly. 15 mL 4    fluticasone propionate 50 MCG/ACT Nasal Suspension USE 2 SPRAYS IN EACH NOSTRIL DAILY. SPRAY LATERALLY TO AVOID THE NASAL SEPTUM.      Metamucil Fiber Oral Chew Tab Chew by mouth. Gummy 1 per day      triamcinolone 0.1 % External Cream as needed.      aspirin 81 MG Oral Tab EC aspirin 81 mg tablet,delayed release, [RxNorm: 169171]      betamethasone dipropionate 0.05 % External Cream as needed.      Calcium Carb-Cholecalciferol (CALTRATE 600+D3 OR) Take 1 tablet by mouth daily.      Probiotic Product (PROBIOTIC-10 OR) Take by mouth.      Multiple Vitamins-Minerals (MULTIVITAL) Oral Chew Tab Chew by mouth.      mupirocin 2 % External Ointment Apply topically daily as needed.  2    metRONIDAZOLE 0.75 % External Cream Apply topically as needed. (Patient not taking: Reported on 4/26/2024)  11       Review of Systems: Review of Systems     Physical Exam:  /64 (BP Location: Right arm, Patient Position: Sitting, Cuff Size: adult)   Pulse 68   Resp 16   Ht 5' 3\" (1.6 m)   Wt 119 lb (54 kg)   LMP 12/11/2010   SpO2 94%   BMI 21.08 kg/m²      Constitutional: alert, cooperative. No acute distress.  HEENT: Head NC/AT. Nares normal. Septum midline. Mucosa normal. No drainage or sinus tenderness.  Cardio: Regular rate and rhythm. Normal S1 and S2. No murmurs.   Respiratory: Thorax symmetrical with no labored breathing. clear to auscultation bilaterally  Extremities: No clubbing or cyanosis. No BLE edema.    Neurologic: A&Ox3. No gross motor deficits.  Skin: Warm, dry  Psych: Calm, cooperative. Pleasant affect.    Results:  Personally reviewed  [facility] 2D+3D SCREENING BILAT (CPT=77067/68556)  Narrative: PROCEDURE:  [facility]  RINA 2D+3D SCREENING BILAT (CPT=77067/84324)     COMPARISON:  [facility] R[facility] 2D+3D SCREENING BILAT (CPT=77067/27196), 2/18/2022, 1:05 PM.  [facility] RINA 2D+3D SCREENING BILAT (CPT=77067/54043), 2/13/2021, 8:19 AM.  [facility] RINA 2D+3D SCREENING BILAT   (CPT=77067/85865), 3/03/2023, 4:05 PM.     INDICATIONS:  Z12.31 Encounter for screening mammogram for malignant neoplasm of breast     VIEWS OBTAINED:  Routine views of both breasts were obtained.    Standard 2D and additional multiplane thin sections of the breasts were obtained for the purpose of Tomosynthesis evaluation.  The images were reconstructed and reviewed on the dedicated Tomosynthesis workstation.     BREAST COMPOSITION:  Scattered areas fibroglandular density.     FINDINGS:  Stable breast parenchymal pattern . No suspicious calcifications, architectural distortion, or spiculated masses are identified.  Postsurgical changes within left breast.                   Impression: CONCLUSION:       BI-RADS CATEGORY:    DIAGNOSTIC CATEGORY 2--BENIGN FINDING:       RECOMMENDATIONS:    ROUTINE MAMMOGRAM AND CLINICAL EVALUATION IN 12 MONTHS.                   A letter explaining the results in lay terms has been sent to the patient.  This exam was evaluated with a computer-aided device.  This patient's information has been entered into a reminder system with a target due date for the next mammogram.        LOCATION:  [facility]        Dictated by (CST): Pablito Whaley MD on 3/09/2024 at 10:22 AM       Finalized by (CST): Pablito Whaley MD on 3/09/2024 at 10:23 AM     XR HIP W OR WO PELVIS 2 OR 3 VIEWS, RIGHT (CPT=73502)  Narrative: PROCEDURE:  XR HIP W OR WO PELVIS 2 OR 3 VIEWS, RIGHT ( CPT=73502)     TECHNIQUE:  Unilateral 2 to 3 views of the hip and pelvis if performed.     COMPARISON:  None.     INDICATIONS:  M25.551 Right hip pain     PATIENT STATED HISTORY: (As transcribed by Technologist)  Pt c/o chronic posterior R hip  discomfort and a \"popping' sensation when she is crawling on the floor with her dog.  No known injury.  No prev fx or surgery.         FINDINGS:    No acute fracture or dislocation.  Joint spaces are maintained.  Minimal osteophyte formation.  No osseous lesions.  The pelvis is intact.  Mild degenerative changes of the lower lumbar spine.                   Impression: CONCLUSION:  No acute osseous findings.  Minimal osteoarthritis.        LOCATION:  [facility]        Dictated by (CST): Lucho Maldonado MD on 3/09/2024 at 8:34 AM       Finalized by (CST): Lucho Maldonado MD on 3/09/2024 at 8:35 AM         PFTs:       No data to display                   No data to display                    WBC: 3.6, done on 2/27/2024.  HGB: 13.5, done on 2/27/2024.  PLT: 211, done on 2/27/2024.     Glucose: 78, done on 2/27/2024.  Cr: 0.87, done on 2/27/2024.  GFR(AA): 108, done on 1/23/2021.  GFR (non-AA): 93, done on 1/23/2021.  CA: 9.4, done on 2/27/2024.  Na: 142, done on 2/27/2024.  K: 4, done on 2/27/2024.  Cl: 107, done on 2/27/2024.  CO2: 28, done on 2/27/2024.  Last ALB was 4.2% done on 2/27/2024.     [facility] RINA 2D+3D SCREENING BILAT (CPT=77067/65531)    Result Date: 3/9/2024  CONCLUSION:   BI-RADS CATEGORY:  DIAGNOSTIC CATEGORY 2--BENIGN FINDING:   RECOMMENDATIONS:  ROUTINE MAMMOGRAM AND CLINICAL EVALUATION IN 12 MONTHS.       A letter explaining the results in lay terms has been sent to the patient.  This exam was evaluated with a computer-aided device.  This patient's information has been entered into a reminder system with a target due date for the next mammogram.   LOCATION:  [facility]   Dictated by ([facility]): Pablito Whaley MD on 3/09/2024 at 10:22 AM     Finalized by (CST): Pablito Whaley MD on 3/09/2024 at 10:23 AM       XR HIP W OR WO PELVIS 2 OR 3 VIEWS, RIGHT (CPT=73502)    Result Date: 3/9/2024  CONCLUSION:  No acute osseous findings.  Minimal osteoarthritis.   LOCATION:  [facility]   Dictated by (CST): Lucho Maldonado MD on  3/09/2024 at 8:34 AM     Finalized by (CST): Lucho Maldonado MD on 3/09/2024 at 8:35 AM         Assessment/Plan:  #1. Multiple pulmonary nodules, concern for SHARDA/MAC  Patient relatively asyptomatic from a respiratory standpoint  CT scans reviewed no evidence of progression  Could consider bronchoscopy down the line if evidence of progression  Discussed possibility of atypical/indolent infection, but if we are not proceeding with treatment, then would not do further invasive testing  Will continue to evaluate symptoms on an ongoing basis    #2. Chronic cough  Suspect gustatory rhinitis  If notice sworsening post nasal drip after meals can try ipratropoum nasal spray    #3. Lung Cancer Surveillance/Screening  Lung Cancer Counseling and Shared Decision Making Session in an Asymptomatic Smoker/Former Smoker   Britt Erwin is a 68 year old female without current symptoms of lung cancer.  History   Smoking Status    Former    Types: Cigarettes   Smokeless Tobacco    Never        She received information on the importance of adherence to annual lung cancer Low Dose CT (LDCT) screening, the impact of her comorbidities and her ability or willingness to undergo diagnosis and treatment. she is in agreement and an order will be placed for CT LUNG LD SCREENING(CPT=71271).    We counseled the importance of maintaining cigarette smoking abstinence if she is a former smoker and the importance of smoking cessation if she is a current smoker and we discussed and furnished information about tobacco cessation interventions.        Return in about 3 months (around 7/26/2024).    I spent 35 minutes obtaining and reviewing records, preparing for the visit including reviewing chart and prior testing, face to face time examining the patient and obtaining history, counseling, arranging and reviewing office-based testing, independently reviewing relevant studies and documentation exclusive of other billable procedures.      Vincent Khan  MD [place]  4/26/2024

## 2024-04-26 NOTE — TELEPHONE ENCOUNTER
Received fax from [facility]'s pharmacy for clarification on Ipratropium nasal spray RX. Pharmacy needing clarification if RX is BID or nightly.     Spoke with dr Abbasi and RX should be BID.    Called pharmacy and verbal order provided to pharmacy.      Closing encounter.

## 2024-05-01 ENCOUNTER — NURSE ONLY (OUTPATIENT)
Dept: ELECTROPHYSIOLOGY | Facility: HOSPITAL | Age: 69
End: 2024-05-01
Attending: Other
Payer: MEDICARE

## 2024-05-01 DIAGNOSIS — H53.9 VISUAL CHANGES: ICD-10-CM

## 2024-05-01 PROCEDURE — 95816 EEG AWAKE AND DROWSY: CPT | Performed by: OTHER

## 2024-05-01 NOTE — PROCEDURES
Date of Procedure: 5/1/2024    Procedure: EEG (ELECTROENCEPHALOGRAM)     HX:A 69YO FEMALE WHO PRESENTED WITH AN EPISODE OF DIZZINESS, VISION DISTURBANCE AND NUMBNESS ON LEFT SIDE OF FACE BACK IN NOVEMBER 2023. HAS HAD 2 TOTAL EPISODES. DENIES HISTORY OF MIGRAINES PRIOR. MRI WAS NEGATIVE FOR ACUTE CHANGES.   PMH:FACIAL PARESTHESIA  RX:ASPIRIN  PT STATE:ALERT OX4  DURING EEG:AWAKE,DROWSY,SLEEP    BACKGROUND ACTIVITY: Posterior rhythm was in the range of 8-10 Hz, reactive to eye opening; symmetrical and synchronous. Drowsiness is characterized by intermittent theta waves bitemporally.    EPILEPTIFORM DISCHARGES: There were no epileptiform discharges recorded throughout the recording.   HYPERVENTILATION: Hyperventilation was not performed.  PHOTIC STIMULATION: Photic stimulation was not performed.   K complex in fronto-central region seen.    IMPRESSION: This is a normal awake and drowsy EEG. This does not rule out seizure disorder. Clinical correlation is advised.    Angel Gonzales MD ([place])   Neurology  [facility]  5/1/2024, 12:57 PM  CC: RAFFAELE CRUM MD

## 2024-07-13 ENCOUNTER — RT VISIT (OUTPATIENT)
Dept: RESPIRATORY THERAPY | Facility: HOSPITAL | Age: 69
End: 2024-07-13
Attending: INTERNAL MEDICINE
Payer: MEDICARE

## 2024-07-13 DIAGNOSIS — R05.3 CHRONIC COUGH: ICD-10-CM

## 2024-07-13 DIAGNOSIS — R91.8 PULMONARY NODULES/LESIONS, MULTIPLE: ICD-10-CM

## 2024-07-13 PROCEDURE — 94010 BREATHING CAPACITY TEST: CPT | Performed by: INTERNAL MEDICINE

## 2024-07-13 PROCEDURE — 94726 PLETHYSMOGRAPHY LUNG VOLUMES: CPT | Performed by: INTERNAL MEDICINE

## 2024-07-16 NOTE — PROCEDURES
Pulmonary Function Test:   Findings:  Spirometry: Prebronchodilator FEV1 is 2.01 L, 96% predicted. FVC is 3.04 L, 114% predicted and FEV1/ FVC ratio is 0.66 with a Z-score of -1.68.  There is no significant bronchodilator response after administration of albuterol.  Postbronchodilator FEV1 is 2.16 or 103% of predicted that improved 8% following bronchodilator treatment  The flow-volume loop demonstrates an obstructive pattern.   Lung Volumes:                                                                     The TLC is 5.41 L, 112% predicted.  The residual volume 2.37 L, 128% predicted.  Diffusion Capacity:  The patient was unable to perform diffusion maneuver due to machine not working correctly    Impression:  There is mild airway obstruction with a FEV1/FVC ratio of 0.66 and a Z-score of -1.68 on spirometry and visualized on flow-volume loop.     Lung volumes appear within normal limits    There are no previous pulmonary function tests available for comparison.     Disclaimer: This PFT has been interpreted in accordance to ATS/ERS interpretation guidelines 2022, with the use of upper and lower limits of normal as well as z-score references. Previous testing (before March 2024) was not performed at [facility] using z-scores and so comparison to previous testing should be taken with caution.    Yonathan Adams MD

## 2024-07-31 ENCOUNTER — OFFICE VISIT (OUTPATIENT)
Facility: CLINIC | Age: 69
End: 2024-07-31
Payer: COMMERCIAL

## 2024-07-31 VITALS
HEIGHT: 63 IN | BODY MASS INDEX: 20.91 KG/M2 | OXYGEN SATURATION: 98 % | SYSTOLIC BLOOD PRESSURE: 104 MMHG | HEART RATE: 82 BPM | RESPIRATION RATE: 16 BRPM | DIASTOLIC BLOOD PRESSURE: 56 MMHG | WEIGHT: 118 LBS

## 2024-07-31 DIAGNOSIS — R91.8 PULMONARY NODULES/LESIONS, MULTIPLE: Primary | ICD-10-CM

## 2024-07-31 PROCEDURE — 1160F RVW MEDS BY RX/DR IN RCRD: CPT | Performed by: INTERNAL MEDICINE

## 2024-07-31 PROCEDURE — 3074F SYST BP LT 130 MM HG: CPT | Performed by: INTERNAL MEDICINE

## 2024-07-31 PROCEDURE — 3078F DIAST BP <80 MM HG: CPT | Performed by: INTERNAL MEDICINE

## 2024-07-31 PROCEDURE — 3008F BODY MASS INDEX DOCD: CPT | Performed by: INTERNAL MEDICINE

## 2024-07-31 PROCEDURE — 99213 OFFICE O/P EST LOW 20 MIN: CPT | Performed by: INTERNAL MEDICINE

## 2024-07-31 PROCEDURE — 1159F MED LIST DOCD IN RCRD: CPT | Performed by: INTERNAL MEDICINE

## 2024-07-31 RX ORDER — EZETIMIBE 10 MG/1
10 TABLET ORAL DAILY
COMMUNITY

## 2024-07-31 NOTE — PROGRESS NOTES
[facility] Pulmonary Follow Up Note    Chief Complaint:  Chief Complaint   Patient presents with    Follow - Up     Follow up on PFT 7/13       History of Present Illness:  Britt Erwin is a 68 year old female who presents today for follow up of shortness of breath/cough    Interval history:  Since last visit, patient had PFTs with mild obstruction otherwise normal.  Has no complaints today      Past Medical History:   Past Medical History:    Allergic rhinitis    Atypical mole    Body piercing    Calculus of kidney    Carotid stenosis    Decorative tattoo    Flatulence/gas pain/belching    Hx of skin cancer, basal cell    on right temple    Migraines    Mondor's disease    BREAST    Osteopenia    Visual impairment    glasses    Wears glasses        Past Surgical History:   Past Surgical History:   Procedure Laterality Date    Bunionectomy Right 02/2022    Cataract Left 01/10/2023    Cataract extraction Right 12/27/2022    Colonoscopy  11/2013    normal; repeat 5 yrs (hx of polyps)    Colonoscopy  11/22/2013    Procedure: COLONOSCOPY;  Surgeon: Amari Meza MD;  Location:  ENDOSCOPY    Colonoscopy  01/2019    normal- repeat 5 yrs    Colonoscopy N/A 01/23/2019    Procedure: COLONOSCOPY, POSSIBLE BIOPSY, POSSIBLE POLYPECTOMY 28821;  Surgeon: Amari Meza MD;  Location: [facility]    Correct bunion,othr methods Left     Egd      Ney localization wire 1 site left (cpt=19281)  1992 ?     benign    Other surgical history      bilateral foot sx    Other surgical history  2009    left carpal tunnel    Other surgical history  1996    ganglion cyst right wrist    Other surgical history  2003    lithotrypsy    Other surgical history      vein surgery     Other surgical history  2016    oral reduction internal fixation RT FOOT 5/2016    Removal of kidney stone  01/24/2022    Skin surgery Right 11/03/2016    MOHS        Family Medical History:   Family History   Problem Relation Age of Onset    Stroke Father      Heart Attack Father     Heart Disorder Father     Cancer Mother 75        Lung cancer with mets to liver and spine    Hypertension Mother     Cancer Sister 64        Multiple myeloma    Depression Sister     Heart Disorder Brother     Colon Cancer Self         Social History:   Social History     Socioeconomic History    Marital status:      Spouse name: Not on file    Number of children: Not on file    Years of education: Not on file    Highest education level: Not on file   Occupational History    Not on file   Tobacco Use    Smoking status: Former     Current packs/day: 0.00     Average packs/day: 1 pack/day for 30.0 years (30.0 ttl pk-yrs)     Types: Cigarettes     Start date: 1977     Quit date: 2007     Years since quittin.7     Passive exposure: Past    Smokeless tobacco: Never   Vaping Use    Vaping status: Never Used   Substance and Sexual Activity    Alcohol use: Yes     Alcohol/week: 3.0 standard drinks of alcohol     Types: 3 Cans of beer per week     Comment: 4 beers weekly    Drug use: Never    Sexual activity: Not on file   Other Topics Concern    Caffeine Concern No    Exercise Yes     Comment: 4x per week    Seat Belt Yes    Special Diet No    Stress Concern No    Weight Concern Yes     Service Not Asked    Blood Transfusions Not Asked    Occupational Exposure Not Asked    Hobby Hazards Not Asked    Sleep Concern Not Asked    Back Care Not Asked    Bike Helmet Not Asked    Self-Exams Not Asked   Social History Narrative    Not on file     Social Determinants of Health     Financial Resource Strain: Not on file   Food Insecurity: Not on file   Transportation Needs: Not on file   Physical Activity: Sufficiently Active (3/22/2021)    [facility] from [facility]    Exercise Vital Sign     Days of Exercise per Week: 5 days     Minutes of Exercise per Session: 30 min   Stress: Not on file   Social Connections: Not on file   Housing  Stability: Not on file        Medications:   Current Outpatient Medications   Medication Sig Dispense Refill    ezetimibe 10 MG Oral Tab Take 1 tablet (10 mg total) by mouth daily.      Calcium Carbonate 1500 (600 Ca) MG Oral Tab Calcium 600 mg calcium (1,500 mg) tablet, [RxNorm: 502952]      ipratropium 0.06 % Nasal Solution 1 spray by Nasal route in the morning and 1 spray before bedtime. 1 sprays in each nostril nightly. 15 mL 4    fluticasone propionate 50 MCG/ACT Nasal Suspension USE 2 SPRAYS IN EACH NOSTRIL DAILY. SPRAY LATERALLY TO AVOID THE NASAL SEPTUM.      Metamucil Fiber Oral Chew Tab Chew by mouth. Gummy 1 per day      triamcinolone 0.1 % External Cream as needed.      aspirin 81 MG Oral Tab EC aspirin 81 mg tablet,delayed release, [RxNorm: 378285]      betamethasone dipropionate 0.05 % External Cream as needed.      Calcium Carb-Cholecalciferol (CALTRATE 600+D3 OR) Take 1 tablet by mouth daily.      Probiotic Product (PROBIOTIC-10 OR) Take by mouth.      Multiple Vitamins-Minerals (MULTIVITAL) Oral Chew Tab Chew by mouth.      mupirocin 2 % External Ointment Apply topically daily as needed.  2    metRONIDAZOLE 0.75 % External Cream Apply topically as needed. (Patient not taking: Reported on 4/26/2024)  11       Review of Systems: Review of Systems     Physical Exam:  /56 (BP Location: Right arm, Patient Position: Sitting, Cuff Size: adult)   Pulse 82   Resp 16   Ht 5' 3\" (1.6 m)   Wt 118 lb (53.5 kg)   LMP 12/11/2010   SpO2 98%   BMI 20.90 kg/m²      Constitutional: alert, cooperative. No acute distress.  HEENT: Head NC/AT. Nares normal. Septum midline. Mucosa normal. No drainage or sinus tenderness.  Cardio: Regular rate and rhythm. Normal S1 and S2. No murmurs.   Respiratory: Thorax symmetrical with no labored breathing. clear to auscultation bilaterally  Extremities: No clubbing or cyanosis. No BLE edema.    Neurologic: A&Ox3. No gross motor deficits.  Skin: Warm, dry  Psych: Calm,  cooperative. Pleasant affect.    Results:  Personally reviewed  [facility] RINA 2D+3D SCREENING BILAT (CPT=77067/75198)  Narrative: PROCEDURE:  [facility] RINA 2D+3D SCREENING BILAT (CPT=77067/33151)     COMPARISON:  [facility], [facility] RINA 2D+3D SCREENING BILAT (CPT=77067/96648), 2/18/2022, 1:05 PM.  [facility], [facility] RINA 2D+3D SCREENING BILAT (CPT=77067/12653), 2/13/2021, 8:19 AM.  [place] , [facility] RINA 2D+3D SCREENING BILAT   (CPT=77067/56302), 3/03/2023, 4:05 PM.     INDICATIONS:  Z12.31 Encounter for screening mammogram for malignant neoplasm of breast     VIEWS OBTAINED:  Routine views of both breasts were obtained.    Standard 2D and additional multiplane thin sections of the breasts were obtained for the purpose of Tomosynthesis evaluation.  The images were reconstructed and reviewed on the dedicated Tomosynthesis workstation.     BREAST COMPOSITION:  Scattered areas fibroglandular density.     FINDINGS:  Stable breast parenchymal pattern . No suspicious calcifications, architectural distortion, or spiculated masses are identified.  Postsurgical changes within left breast.                   Impression: CONCLUSION:       BI-RADS CATEGORY:    DIAGNOSTIC CATEGORY 2--BENIGN FINDING:       RECOMMENDATIONS:    ROUTINE MAMMOGRAM AND CLINICAL EVALUATION IN 12 MONTHS.                   A letter explaining the results in lay terms has been sent to the patient.  This exam was evaluated with a computer-aided device.  This patient's information has been entered into a reminder system with a target due date for the next mammogram.        LOCATION:  [facility]        Dictated by (CST): Pablito Whaley MD on 3/09/2024 at 10:22 AM       Finalized by (CST): Pablito Whaley MD on 3/09/2024 at 10:23 AM     XR HIP W OR WO PELVIS 2 OR 3 VIEWS, RIGHT (CPT=73502)  Narrative: PROCEDURE:  XR HIP W OR WO PELVIS 2 OR 3 VIEWS, RIGHT ( CPT=73502)     TECHNIQUE:  Unilateral 2 to 3 views of the hip and pelvis if performed.     COMPARISON:   None.     INDICATIONS:  M25.551 Right hip pain     PATIENT STATED HISTORY: (As transcribed by Technologist)  Pt c/o chronic posterior R hip discomfort and a \"popping' sensation when she is crawling on the floor with her dog.  No known injury.  No prev fx or surgery.         FINDINGS:    No acute fracture or dislocation.  Joint spaces are maintained.  Minimal osteophyte formation.  No osseous lesions.  The pelvis is intact.  Mild degenerative changes of the lower lumbar spine.                   Impression: CONCLUSION:  No acute osseous findings.  Minimal osteoarthritis.        LOCATION:  [facility]        Dictated by (CST): Lucho Maldonado MD on 3/09/2024 at 8:34 AM       Finalized by (CST): Lucho Maldonado MD on 3/09/2024 at 8:35 AM         PFTs:       No data to display                   No data to display                    WBC: 3.6, done on 2/27/2024.  HGB: 13.5, done on 2/27/2024.  PLT: 211, done on 2/27/2024.     Glucose: 78, done on 2/27/2024.  Cr: 0.87, done on 2/27/2024.  Last eGFR was 73 on 2/27/2024.  CA: 9.4, done on 2/27/2024.  Na: 142, done on 2/27/2024.  K: 4, done on 2/27/2024.  Cl: 107, done on 2/27/2024.  CO2: 28, done on 2/27/2024.  Last ALB was 4.2% done on 2/27/2024.     No results found.     Assessment/Plan:  #1. Mild COPD  Patient asymptomatic so will hold off on treatment  Patient educated on if develops pulmonary symptoms to let me know    #2. Multuple pulmonary nodules  Follow up imaging around october (6 months)  Follow up after that      Return in about 4 months (around 11/30/2024).    I spent 20 minutes obtaining and reviewing records, preparing for the visit including reviewing chart and prior testing, face to face time examining the patient and obtaining history, counseling, arranging and reviewing office-based testing, independently reviewing relevant studies and documentation exclusive of other billable procedures.      Vincent Abbasi MD  7/31/2024

## 2024-08-13 ENCOUNTER — OFFICE VISIT (OUTPATIENT)
Dept: NEUROLOGY | Facility: CLINIC | Age: 69
End: 2024-08-13
Payer: COMMERCIAL

## 2024-08-13 VITALS
SYSTOLIC BLOOD PRESSURE: 118 MMHG | DIASTOLIC BLOOD PRESSURE: 72 MMHG | HEART RATE: 58 BPM | BODY MASS INDEX: 21 KG/M2 | WEIGHT: 117 LBS | RESPIRATION RATE: 16 BRPM

## 2024-08-13 DIAGNOSIS — G43.109 OCULAR MIGRAINE: Primary | ICD-10-CM

## 2024-08-13 PROCEDURE — 3074F SYST BP LT 130 MM HG: CPT | Performed by: OTHER

## 2024-08-13 PROCEDURE — 3078F DIAST BP <80 MM HG: CPT | Performed by: OTHER

## 2024-08-13 PROCEDURE — 99214 OFFICE O/P EST MOD 30 MIN: CPT | Performed by: OTHER

## 2024-08-13 PROCEDURE — 1159F MED LIST DOCD IN RCRD: CPT | Performed by: OTHER

## 2024-08-13 NOTE — PATIENT INSTRUCTIONS
Refill policies:    Allow 2-3 business days for refills; controlled substances may take longer.  Contact your pharmacy at least 5 days prior to running out of medication and have them send an electronic request or submit request through the “request refill” option in your [facility] account.  Refills are not addressed on weekends; covering physicians do not authorize routine medications on weekends.  No narcotics or controlled substances are refilled after noon on Fridays or by on call physicians.  By law, narcotics must be electronically prescribed.  A 30 day supply with no refills is the maximum allowed.  If your prescription is due for a refill, you may be due for a follow up appointment.  To best provide you care, patients receiving routine medications need to be seen at least once a year.  Patients receiving narcotic/controlled substance medications need to be seen at least once every 3 months.  In the event that your preferred pharmacy does not have the requested medication in stock (e.g. Backordered), it is your responsibility to find another pharmacy that has the requested medication available.  We will gladly send a new prescription to that pharmacy at your request.    Scheduling Tests:    If your physician has ordered radiology tests such as MRI or CT scans, please contact Central Scheduling at [phone] right away to schedule the test.  Once scheduled, the [facility] Centralized Referral Team will work with your insurance carrier to obtain pre-certification or prior authorization.  Depending on your insurance carrier, approval may take 3-10 days.  It is highly recommended patients assure they have received an authorization before having a test performed.  If test is done without insurance authorization, patient may be responsible for the entire amount billed.      Precertification and Prior Authorizations:  If your physician has recommended that you have a procedure or additional testing performed the [facility]  Centralized Referral Team will contact your insurance carrier to obtain pre-certification or prior authorization.    You are strongly encouraged to contact your insurance carrier to verify that your procedure/test has been approved and is a COVERED benefit.  Although the [facility] Centralized Referral Team does its due diligence, the insurance carrier gives the disclaimer that \"Although the procedure is authorized, this does not guarantee payment.\"    Ultimately the patient is responsible for payment.   Thank you for your understanding in this matter.  Paperwork Completion:  If you require FMLA or disability paperwork for your recovery, please make sure to either drop it off or have it faxed to our office at [phone]. Be sure the form has your name and date of birth on it.  The form will be faxed to our Forms Department and they will complete it for you.  There is a 25$ fee for all forms that need to be filled out.  Please be aware there is a 10-14 day turnaround time.  You will need to sign a release of information (ELROY) form if your paperwork does not come with one.  You may call the Forms Department with any questions at [phone].  Their fax number is [phone].

## 2024-08-13 NOTE — PROGRESS NOTES
[facility] Neurology Outpatient Progress Note  Date of service: 8/13/2024    Assessment:     ICD-10-CM    1. Ocular migraine  G43.109       Other ddx TIA    Plan:  Cont ASA 81 mg  Cardiologist, Opthalmologist to follow   EEG reviewed, negative  Reviewed MRI, MRA, no high grade carotid artery stenosis  Migraine education given  Monitor events   See orders and medications filed with this encounter. The patient indicates understanding of these issues and agrees with the plan.  Discussed with patient regarding assessment, work up, care plan   RTC 6 months, may consider migraine medication trial if becomes more frequent   Pt should go ER for any new or worsening symptoms and contact office     Subjective:   History:  Patient here for a follow-up visit for visual episodes. Since last visit mild episode reported, did not last long, no vision loss or associated focal weakness. Here to review test and care plan. Is seeing cardiologist.  Per initial visit note:  Britt Erwin is a 68 year old female with past medical history as listed below presents here for initial evaluation of episode of dizziness, vision disturbance and numbness on left side of face. Was in ER for this episode in 11/2023. Has had 2 total episodes (1st about one year ago before last nov episode). Denies history of migraine prior, daughter has migraine. Her MRI at ER was negative for acute change.        History/Other:   REVIEW OF SYSTEMS:  A 10-point system was reviewed. Pertinent positives and negatives are noted as above       Current Outpatient Medications:     ezetimibe 10 MG Oral Tab, Take 1 tablet (10 mg total) by mouth daily., Disp: , Rfl:     fluticasone propionate 50 MCG/ACT Nasal Suspension, USE 2 SPRAYS IN EACH NOSTRIL DAILY. SPRAY LATERALLY TO AVOID THE NASAL SEPTUM., Disp: , Rfl:     Metamucil Fiber Oral Chew Tab, Chew by mouth. Gummy 1 per day, Disp: , Rfl:     triamcinolone 0.1 % External Cream, as needed., Disp: , Rfl:     aspirin 81 MG Oral  Tab EC, aspirin 81 mg tablet,delayed release, [RxNorm: 744994], Disp: , Rfl:     betamethasone dipropionate 0.05 % External Cream, as needed., Disp: , Rfl:     Calcium Carb-Cholecalciferol (CALTRATE 600+D3 OR), Take 1 tablet by mouth daily., Disp: , Rfl:     metRONIDAZOLE 0.75 % External Cream, Apply topically as needed., Disp: , Rfl: 11    Probiotic Product (PROBIOTIC-10 OR), Take by mouth., Disp: , Rfl:     Multiple Vitamins-Minerals (MULTIVITAL) Oral Chew Tab, Chew by mouth., Disp: , Rfl:     mupirocin 2 % External Ointment, Apply topically daily as needed., Disp: , Rfl: 2  Allergies:  Allergies   Allergen Reactions    Nickel SWELLING     Swelling rash on skin    Statins MYALGIA     Past Medical History:    Allergic rhinitis    Atypical mole    Body piercing    Calculus of kidney    Carotid stenosis    Decorative tattoo    Flatulence/gas pain/belching    Hx of skin cancer, basal cell    on right temple    Migraines    Mondor's disease    BREAST    Osteopenia    Visual impairment    glasses    Wears glasses     Past Surgical History:   Procedure Laterality Date    Bunionectomy Right 02/2022    Cataract Left 01/10/2023    Cataract extraction Right 12/27/2022    Colonoscopy  11/2013    normal; repeat 5 yrs (hx of polyps)    Colonoscopy  11/22/2013    Procedure: COLONOSCOPY;  Surgeon: Amari Meza MD;  Location:  ENDOSCOPY    Colonoscopy  01/2019    normal- repeat 5 yrs    Colonoscopy N/A 01/23/2019    Procedure: COLONOSCOPY, POSSIBLE BIOPSY, POSSIBLE POLYPECTOMY 70340;  Surgeon: Amari Meza MD;  Location: [facility]    Correct bunion,othr methods Left     Egd      Ney localization wire 1 site left (cpt=19281)  1992 ?     benign    Other surgical history      bilateral foot sx    Other surgical history  2009    left carpal tunnel    Other surgical history  1996    ganglion cyst right wrist    Other surgical history  2003    lithotrypsy    Other surgical history      vein surgery     Other  surgical history  2016    oral reduction internal fixation RT FOOT 2016    Removal of kidney stone  2022    Skin surgery Right 2016    MOHS      Social History:  Social History     Tobacco Use    Smoking status: Former     Current packs/day: 0.00     Average packs/day: 1 pack/day for 30.0 years (30.0 ttl pk-yrs)     Types: Cigarettes     Start date: 1977     Quit date: 2007     Years since quittin.7     Passive exposure: Past    Smokeless tobacco: Never   Substance Use Topics    Alcohol use: Yes     Alcohol/week: 3.0 standard drinks of alcohol     Types: 3 Cans of beer per week     Comment: 4 beers weekly     Family History   Problem Relation Age of Onset    Stroke Father     Heart Attack Father     Heart Disorder Father     Cancer Mother 75        Lung cancer with mets to liver and spine    Hypertension Mother     Cancer Sister 64        Multiple myeloma    Depression Sister     Heart Disorder Brother     Colon Cancer Self      Objective:   Neurological Examination:  /72   Pulse 58   Resp 16   Wt 117 lb (53.1 kg)   LMP 2010   BMI 20.73 kg/m²   Mental status: A & O X 3  Language: no aphasia  Speech: no dysarthria  CN II-XII: intact   Motor strength: 5/5 all extremities  Tone: normal  Coordination: normal  Sensory: symmetric  Gait: normal    Test reviewed on 2024      Angel \"Geoffrey\" MD [facility]  Neurology  [facility]  2024, 9:15 AM  CC: RAFFAELE CRUM MD

## 2024-08-22 ENCOUNTER — MED REC SCAN ONLY (OUTPATIENT)
Dept: FAMILY MEDICINE CLINIC | Facility: CLINIC | Age: 69
End: 2024-08-22

## 2024-10-08 ENCOUNTER — TELEPHONE (OUTPATIENT)
Facility: CLINIC | Age: 69
End: 2024-10-08

## 2024-10-08 NOTE — TELEPHONE ENCOUNTER
Received fax from [facility] with patients CT chest that was completed 10/07/24. Patient has upcoming appointment in office 11/14/24.  Placed form in dr tellez in basket to review. Will send to scanning once reviewed.

## 2024-11-14 ENCOUNTER — OFFICE VISIT (OUTPATIENT)
Age: 69
End: 2024-11-14
Payer: COMMERCIAL

## 2024-11-14 VITALS
OXYGEN SATURATION: 95 % | RESPIRATION RATE: 16 BRPM | WEIGHT: 118 LBS | HEART RATE: 66 BPM | SYSTOLIC BLOOD PRESSURE: 114 MMHG | BODY MASS INDEX: 20.91 KG/M2 | HEIGHT: 63 IN | DIASTOLIC BLOOD PRESSURE: 68 MMHG

## 2024-11-14 DIAGNOSIS — R91.8 PULMONARY NODULES/LESIONS, MULTIPLE: Primary | ICD-10-CM

## 2024-11-14 PROCEDURE — 3074F SYST BP LT 130 MM HG: CPT | Performed by: INTERNAL MEDICINE

## 2024-11-14 PROCEDURE — 99214 OFFICE O/P EST MOD 30 MIN: CPT | Performed by: INTERNAL MEDICINE

## 2024-11-14 PROCEDURE — 3008F BODY MASS INDEX DOCD: CPT | Performed by: INTERNAL MEDICINE

## 2024-11-14 PROCEDURE — 3078F DIAST BP <80 MM HG: CPT | Performed by: INTERNAL MEDICINE

## 2024-11-14 PROCEDURE — 1160F RVW MEDS BY RX/DR IN RCRD: CPT | Performed by: INTERNAL MEDICINE

## 2024-11-14 PROCEDURE — 1159F MED LIST DOCD IN RCRD: CPT | Performed by: INTERNAL MEDICINE

## 2024-11-14 NOTE — PROGRESS NOTES
[facility] Pulmonary Follow Up Note    Chief Complaint:  Chief Complaint   Patient presents with    Follow - Up     Follow up on CT        History of Present Illness:  Britt Erwin is a 68 year old female who presents today for follow up of shortness of breath/cough.    Interval history:  Since last visit, patient had CT as listed below.  Remains asymptomatic from a respiratory standpoint.      Past Medical History:   Past Medical History:    Allergic rhinitis    Atypical mole    Body piercing    Calculus of kidney    Carotid stenosis    Decorative tattoo    Flatulence/gas pain/belching    Hx of skin cancer, basal cell    on right temple    Migraines    Mondor's disease    BREAST    Osteopenia    Visual impairment    glasses    Wears glasses        Past Surgical History:   Past Surgical History:   Procedure Laterality Date    Bunionectomy Right 02/2022    Cataract Left 01/10/2023    Cataract extraction Right 12/27/2022    Colonoscopy  11/2013    normal; repeat 5 yrs (hx of polyps)    Colonoscopy  11/22/2013    Procedure: COLONOSCOPY;  Surgeon: Amari Meza MD;  Location:  ENDOSCOPY    Colonoscopy  01/2019    normal- repeat 5 yrs    Colonoscopy N/A 01/23/2019    Procedure: COLONOSCOPY, POSSIBLE BIOPSY, POSSIBLE POLYPECTOMY 03675;  Surgeon: Amari Meza MD;  Location: [facility]    Correct bunion,othr methods Left     Egd      Ney localization wire 1 site left (cpt=19281)  1992 ?     benign    Other surgical history      bilateral foot sx    Other surgical history  2009    left carpal tunnel    Other surgical history  1996    ganglion cyst right wrist    Other surgical history  2003    lithotrypsy    Other surgical history      vein surgery     Other surgical history  2016    oral reduction internal fixation RT FOOT 5/2016    Removal of kidney stone  01/24/2022    Skin surgery Right 11/03/2016    MOHS        Family Medical History:   Family History   Problem Relation Age of Onset    Stroke Father      Heart Attack Father     Heart Disorder Father     Cancer Mother 75        Lung cancer with mets to liver and spine    Hypertension Mother     Cancer Sister 64        Multiple myeloma    Depression Sister     Heart Disorder Brother     Colon Cancer Self         Social History:   Social History     Socioeconomic History    Marital status:      Spouse name: Not on file    Number of children: Not on file    Years of education: Not on file    Highest education level: Not on file   Occupational History    Not on file   Tobacco Use    Smoking status: Former     Current packs/day: 0.00     Average packs/day: 1 pack/day for 30.0 years (30.0 ttl pk-yrs)     Types: Cigarettes     Start date: 1977     Quit date: 2007     Years since quittin.9     Passive exposure: Past    Smokeless tobacco: Never   Vaping Use    Vaping status: Never Used   Substance and Sexual Activity    Alcohol use: Yes     Alcohol/week: 3.0 standard drinks of alcohol     Types: 3 Cans of beer per week     Comment: 4 beers weekly    Drug use: Never    Sexual activity: Not on file   Other Topics Concern    Caffeine Concern Yes     Comment: daily    Exercise Yes     Comment: 4x per week    Seat Belt Yes    Special Diet No    Stress Concern No    Weight Concern Yes     Service Not Asked    Blood Transfusions Not Asked    Occupational Exposure Not Asked    Hobby Hazards Not Asked    Sleep Concern Not Asked    Back Care Not Asked    Bike Helmet Not Asked    Self-Exams Not Asked   Social History Narrative    Not on file     Social Drivers of Health     Financial Resource Strain: Not on file   Food Insecurity: Not on file   Transportation Needs: Not on file   Physical Activity: Sufficiently Active (3/22/2021)    Received from [facility] [employer] [employer], [facility]    Exercise Vital Sign     Days of Exercise per Week: 5 days     Minutes of Exercise per Session: 30 min   Stress: Not on file   Social Connections: Not on file    Housing Stability: Not on file        Medications:   Current Outpatient Medications   Medication Sig Dispense Refill    ezetimibe 10 MG Oral Tab Take 1 tablet (10 mg total) by mouth daily.      fluticasone propionate 50 MCG/ACT Nasal Suspension USE 2 SPRAYS IN EACH NOSTRIL DAILY. SPRAY LATERALLY TO AVOID THE NASAL SEPTUM.      Metamucil Fiber Oral Chew Tab Chew by mouth. Gummy 1 per day      triamcinolone 0.1 % External Cream as needed.      aspirin 81 MG Oral Tab EC aspirin 81 mg tablet,delayed release, [RxNorm: 316193]      betamethasone dipropionate 0.05 % External Cream as needed.      Calcium Carb-Cholecalciferol (CALTRATE 600+D3 OR) Take 1 tablet by mouth daily.      metRONIDAZOLE 0.75 % External Cream Apply topically as needed.  11    Probiotic Product (PROBIOTIC-10 OR) Take by mouth.      Multiple Vitamins-Minerals (MULTIVITAL) Oral Chew Tab Chew by mouth.      mupirocin 2 % External Ointment Apply topically daily as needed.  2       Review of Systems: Review of Systems     Physical Exam:  /68 (BP Location: Right arm, Patient Position: Sitting, Cuff Size: adult)   Pulse 66   Resp 16   Ht 5' 3\" (1.6 m)   Wt 118 lb (53.5 kg)   LMP 12/11/2010   SpO2 95%   BMI 20.90 kg/m²      Constitutional: alert, cooperative. No acute distress.  HEENT: Head NC/AT. Nares normal. Septum midline. Mucosa normal. No drainage or sinus tenderness.  Cardio: Regular rate and rhythm. Normal S1 and S2. No murmurs.   Respiratory: Thorax symmetrical with no labored breathing. clear to auscultation bilaterally  Extremities: No clubbing or cyanosis. No BLE edema.    Neurologic: A&Ox3. No gross motor deficits.  Skin: Warm, dry  Psych: Calm, cooperative. Pleasant affect.    Results:  Personally reviewed  [facility] [employer] 2D+3D SCREENING BILAT (CPT=77067/65033)  Narrative: PROCEDURE:  [facility] [employer] 2D+3D SCREENING BILAT (CPT=77067/77575)     COMPARISON:  [facility], [facility] RINA 2D+3D SCREENING BILAT (CPT=77067/89959), 2/18/2022, 1:05  PM.  [facility] RINA 2D+3D SCREENING BILAT (CPT=77067/03422), 2/13/2021, 8:19 AM.  [facility] 2D+3D SCREENING BILAT   (CPT=77067/00518), 3/03/2023, 4:05 PM.     INDICATIONS:  Z12.31 Encounter for screening mammogram for malignant neoplasm of breast     VIEWS OBTAINED:  Routine views of both breasts were obtained.    Standard 2D and additional multiplane thin sections of the breasts were obtained for the purpose of Tomosynthesis evaluation.  The images were reconstructed and reviewed on the dedicated Tomosynthesis workstation.     BREAST COMPOSITION:  Scattered areas fibroglandular density.     FINDINGS:  Stable breast parenchymal pattern . No suspicious calcifications, architectural distortion, or spiculated masses are identified.  Postsurgical changes within left breast.                   Impression: CONCLUSION:       BI-RADS CATEGORY:    DIAGNOSTIC CATEGORY 2--BENIGN FINDING:       RECOMMENDATIONS:    ROUTINE MAMMOGRAM AND CLINICAL EVALUATION IN 12 MONTHS.                   A letter explaining the results in lay terms has been sent to the patient.  This exam was evaluated with a computer-aided device.  This patient's information has been entered into a reminder system with a target due date for the next mammogram.        LOCATION:  [facility]        Dictated by (CST): Pablito Whaley MD on 3/09/2024 at 10:22 AM       Finalized by (CST): Pablito Whaley MD on 3/09/2024 at 10:23 AM     XR HIP W OR WO PELVIS 2 OR 3 VIEWS, RIGHT (CPT=73502)  Narrative: PROCEDURE:  XR HIP W OR WO PELVIS 2 OR 3 VIEWS, RIGHT ( CPT=73502)     TECHNIQUE:  Unilateral 2 to 3 views of the hip and pelvis if performed.     COMPARISON:  None.     INDICATIONS:  M25.551 Right hip pain     PATIENT STATED HISTORY: (As transcribed by Technologist)  Pt c/o chronic posterior R hip discomfort and a \"popping' sensation when she is crawling on the floor with her dog.  No known injury.  No prev fx or surgery.         FINDINGS:    No  acute fracture or dislocation.  Joint spaces are maintained.  Minimal osteophyte formation.  No osseous lesions.  The pelvis is intact.  Mild degenerative changes of the lower lumbar spine.                   Impression: CONCLUSION:  No acute osseous findings.  Minimal osteoarthritis.        LOCATION:  [facility]        Dictated by (CST): Lucho Maldonado MD on 3/09/2024 at 8:34 AM       Finalized by (CST): Lucho Maldonado MD on 3/09/2024 at 8:35 AM         PFTs:       No data to display                   No data to display                    WBC: 3.6, done on 2/27/2024.  HGB: 13.5, done on 2/27/2024.  PLT: 211, done on 2/27/2024.     Glucose: 78, done on 2/27/2024.  Cr: 0.87, done on 2/27/2024.  Last eGFR was 73 on 2/27/2024.  CA: 9.4, done on 2/27/2024.  Na: 142, done on 2/27/2024.  K: 4, done on 2/27/2024.  Cl: 107, done on 2/27/2024.  CO2: 28, done on 2/27/2024.  Last ALB was 4.2% done on 2/27/2024.     No results found.     Assessment/Plan:  #1. Muliple lung nodules  Reviewed imaging with patient  Would need annual follow up    #2. Lung Cancer Surveillance/Screening  Lung Cancer Counseling and Shared Decision Making Session in an Asymptomatic Smoker/Former Smoker   Britt Erwin is a 69 year old female without current symptoms of lung cancer.  History   Smoking Status    Former    Types: Cigarettes   Smokeless Tobacco    Never        She received information on the importance of adherence to annual lung cancer Low Dose CT (LDCT) screening, the impact of her comorbidities and her ability or willingness to undergo diagnosis and treatment. she is in agreement and an order will be placed for CT LUNG LD SCREENING(CPT=71271).    We counseled the importance of maintaining cigarette smoking abstinence if she is a former smoker and the importance of smoking cessation if she is a current smoker and we discussed and furnished information about tobacco cessation interventions.      Return in about 1 year (around  11/14/2025).    I spent 30 minutes obtaining and reviewing records, preparing for the visit including reviewing chart and prior testing, face to face time examining the patient and obtaining history, counseling, arranging and reviewing office-based testing, independently reviewing relevant studies and documentation exclusive of other billable procedures.      Vincent Abbasi MD  11/14/2024

## 2025-01-20 ENCOUNTER — OFFICE VISIT (OUTPATIENT)
Dept: NEUROLOGY | Facility: CLINIC | Age: 70
End: 2025-01-20
Payer: COMMERCIAL

## 2025-01-20 VITALS
BODY MASS INDEX: 20 KG/M2 | HEART RATE: 81 BPM | WEIGHT: 115 LBS | RESPIRATION RATE: 16 BRPM | SYSTOLIC BLOOD PRESSURE: 118 MMHG | DIASTOLIC BLOOD PRESSURE: 86 MMHG

## 2025-01-20 DIAGNOSIS — G43.109 OCULAR MIGRAINE: Primary | ICD-10-CM

## 2025-01-20 PROCEDURE — 99214 OFFICE O/P EST MOD 30 MIN: CPT | Performed by: OTHER

## 2025-01-20 PROCEDURE — 3079F DIAST BP 80-89 MM HG: CPT | Performed by: OTHER

## 2025-01-20 PROCEDURE — 1159F MED LIST DOCD IN RCRD: CPT | Performed by: OTHER

## 2025-01-20 PROCEDURE — 3074F SYST BP LT 130 MM HG: CPT | Performed by: OTHER

## 2025-01-20 NOTE — PATIENT INSTRUCTIONS
Refill policies:    Allow 2-3 business days for refills; controlled substances may take longer.  Contact your pharmacy at least 5 days prior to running out of medication and have them send an electronic request or submit request through the “request refill” option in your [employer] account.  Refills are not addressed on weekends; covering physicians do not authorize routine medications on weekends.  No narcotics or controlled substances are refilled after noon on Fridays or by on call physicians.  By law, narcotics must be electronically prescribed.  A 30 day supply with no refills is the maximum allowed.  If your prescription is due for a refill, you may be due for a follow up appointment.  To best provide you care, patients receiving routine medications need to be seen at least once a year.  Patients receiving narcotic/controlled substance medications need to be seen at least once every 3 months.  In the event that your preferred pharmacy does not have the requested medication in stock (e.g. Backordered), it is your responsibility to find another pharmacy that has the requested medication available.  We will gladly send a new prescription to that pharmacy at your request.    Scheduling Tests:    If your physician has ordered radiology tests such as MRI or CT scans, please contact Central Scheduling at [phone] right away to schedule the test.  Once scheduled, the [facility] Centralized Referral Team will work with your insurance carrier to obtain pre-certification or prior authorization.  Depending on your insurance carrier, approval may take 3-10 days.  It is highly recommended patients assure they have received an authorization before having a test performed.  If test is done without insurance authorization, patient may be responsible for the entire amount billed.      Precertification and Prior Authorizations:  If your physician has recommended that you have a procedure or additional testing performed the [facility]  Centralized Referral Team will contact your insurance carrier to obtain pre-certification or prior authorization.    You are strongly encouraged to contact your insurance carrier to verify that your procedure/test has been approved and is a COVERED benefit.  Although the [facility] Centralized Referral Team does its due diligence, the insurance carrier gives the disclaimer that \"Although the procedure is authorized, this does not guarantee payment.\"    Ultimately the patient is responsible for payment.   Thank you for your understanding in this matter.  Paperwork Completion:  If you require FMLA or disability paperwork for your recovery, please make sure to either drop it off or have it faxed to our office at [phone]. Be sure the form has your name and date of birth on it.  The form will be faxed to our Forms Department and they will complete it for you.  There is a 25$ fee for all forms that need to be filled out.  Please be aware there is a 10-14 day turnaround time.  You will need to sign a release of information (ELROY) form if your paperwork does not come with one.  You may call the Forms Department with any questions at [phone].  Their fax number is [phone].

## 2025-01-20 NOTE — PROGRESS NOTES
[facility] Neurology Outpatient Progress Note  Date of service: 1/20/2025    Assessment:     ICD-10-CM    1. Ocular migraine  G43.109           Plan:  Cont ASA 81 mg  Cardiologist, Opthalmologist to follow   EEG reviewed, negative  Reviewed MRI, MRA, no high grade carotid artery stenosis  Migraine education given  Monitor events   See orders and medications filed with this encounter. The patient indicates understanding of these issues and agrees with the plan.  Discussed with patient regarding assessment, care plan   RTC 6 months, may consider migraine preventive or abortive medication trial if becomes more frequent and if pt is interested  Pt should go ER for any new or worsening symptoms and contact office     Subjective:   History:  Patient here for a follow-up visit ,doing well.  since last visit less episode reported, did not last long, no vision loss or associated focal weakness. Here to review test and care plan. Is seeing opthalmologist, cardiologist.  Per initial visit note:  Britt Erwin is a 68 year old female with past medical history as listed below presents here for initial evaluation of episode of dizziness, vision disturbance and numbness on left side of face. Was in ER for this episode in 11/2023. Has had 2 total episodes (1st about one year ago before last nov episode). Denies history of migraine prior, daughter has migraine. Her MRI at ER was negative for acute change    History/Other:   REVIEW OF SYSTEMS:  A 10-point system was reviewed. Pertinent positives and negatives are noted as above       Current Outpatient Medications:     ezetimibe 10 MG Oral Tab, Take 1 tablet (10 mg total) by mouth daily., Disp: , Rfl:     fluticasone propionate 50 MCG/ACT Nasal Suspension, USE 2 SPRAYS IN EACH NOSTRIL DAILY. SPRAY LATERALLY TO AVOID THE NASAL SEPTUM., Disp: , Rfl:     Metamucil Fiber Oral Chew Tab, Chew by mouth. Gummy 1 per day, Disp: , Rfl:     triamcinolone 0.1 % External Cream, as needed., Disp: ,  Rfl:     aspirin 81 MG Oral Tab EC, aspirin 81 mg tablet,delayed release, [RxNorm: 403030], Disp: , Rfl:     betamethasone dipropionate 0.05 % External Cream, as needed., Disp: , Rfl:     Calcium Carb-Cholecalciferol (CALTRATE 600+D3 OR), Take 1 tablet by mouth daily., Disp: , Rfl:     metRONIDAZOLE 0.75 % External Cream, Apply topically as needed., Disp: , Rfl: 11    Probiotic Product (PROBIOTIC-10 OR), Take by mouth., Disp: , Rfl:     Multiple Vitamins-Minerals (MULTIVITAL) Oral Chew Tab, Chew by mouth., Disp: , Rfl:     mupirocin 2 % External Ointment, Apply topically daily as needed., Disp: , Rfl: 2  Allergies:  Allergies[1]  Past Medical History:    Allergic rhinitis    Atypical mole    Body piercing    Calculus of kidney    Carotid stenosis    Decorative tattoo    Flatulence/gas pain/belching    Hx of skin cancer, basal cell    on right temple    Migraines    Mondor's disease    BREAST    Osteopenia    Visual impairment    glasses    Wears glasses     Past Surgical History:   Procedure Laterality Date    Bunionectomy Right 02/2022    Cataract Left 01/10/2023    Cataract extraction Right 12/27/2022    Colonoscopy  11/2013    normal; repeat 5 yrs (hx of polyps)    Colonoscopy  11/22/2013    Procedure: COLONOSCOPY;  Surgeon: Amari Meza MD;  Location:  ENDOSCOPY    Colonoscopy  01/2019    normal- repeat 5 yrs    Colonoscopy N/A 01/23/2019    Procedure: COLONOSCOPY, POSSIBLE BIOPSY, POSSIBLE POLYPECTOMY 64578;  Surgeon: Amari Meza MD;  Location: [facility]    Correct bunion,othr methods Left     Egd      Ney localization wire 1 site left (cpt=19281)  1992 ?     benign    Other surgical history      bilateral foot sx    Other surgical history  2009    left carpal tunnel    Other surgical history  1996    ganglion cyst right wrist    Other surgical history  2003    lithotrypsy    Other surgical history      vein surgery     Other surgical history  2016    oral reduction internal fixation RT  FOOT 2016    Removal of kidney stone  2022    Skin surgery Right 2016    MOHS      Social History:  Social History     Tobacco Use    Smoking status: Former     Current packs/day: 0.00     Average packs/day: 1 pack/day for 30.0 years (30.0 ttl pk-yrs)     Types: Cigarettes     Start date: 1977     Quit date: 2007     Years since quittin.1     Passive exposure: Past    Smokeless tobacco: Never   Substance Use Topics    Alcohol use: Yes     Alcohol/week: 3.0 standard drinks of alcohol     Types: 3 Cans of beer per week     Comment: 4 beers weekly     Family History   Problem Relation Age of Onset    Stroke Father     Heart Attack Father     Heart Disorder Father     Cancer Mother 75        Lung cancer with mets to liver and spine    Hypertension Mother     Cancer Sister 64        Multiple myeloma    Depression Sister     Heart Disorder Brother     Colon Cancer Self       Objective:   Neurological Examination:  /86   Pulse 81   Resp 16   Wt 115 lb (52.2 kg)   LMP 2010   BMI 20.37 kg/m²   Mental status: A & O X 3  Language: no aphasia  Speech: no dysarthria  CN II-XII: intact   Motor strength: 5/5 all extremities  Tone: normal  Coordination: normal  Sensory: symmetric  Gait: normal    Test reviewed on 2025      Angel \"Geoffrey\" MD [facility]  Neurology  [facility]  2025, 9:38 AM  CC: RAFFAELE CRUM MD         [1]   Allergies  Allergen Reactions    Nickel SWELLING     Swelling rash on skin    Statins MYALGIA

## 2025-02-25 ENCOUNTER — OFFICE VISIT (OUTPATIENT)
Dept: FAMILY MEDICINE CLINIC | Facility: CLINIC | Age: 70
End: 2025-02-25
Payer: COMMERCIAL

## 2025-02-25 VITALS
RESPIRATION RATE: 16 BRPM | WEIGHT: 118.38 LBS | HEART RATE: 68 BPM | SYSTOLIC BLOOD PRESSURE: 122 MMHG | BODY MASS INDEX: 20.98 KG/M2 | DIASTOLIC BLOOD PRESSURE: 66 MMHG | TEMPERATURE: 98 F | OXYGEN SATURATION: 99 % | HEIGHT: 63 IN

## 2025-02-25 DIAGNOSIS — T46.6X5A MYALGIA DUE TO STATIN: ICD-10-CM

## 2025-02-25 DIAGNOSIS — Z00.00 ENCOUNTER FOR ANNUAL HEALTH EXAMINATION: Primary | ICD-10-CM

## 2025-02-25 DIAGNOSIS — M16.12 PRIMARY OSTEOARTHRITIS OF LEFT HIP: ICD-10-CM

## 2025-02-25 DIAGNOSIS — M79.10 MYALGIA DUE TO STATIN: ICD-10-CM

## 2025-02-25 DIAGNOSIS — I65.23 BILATERAL CAROTID ARTERY STENOSIS: ICD-10-CM

## 2025-02-25 DIAGNOSIS — Z12.31 ENCOUNTER FOR SCREENING MAMMOGRAM FOR MALIGNANT NEOPLASM OF BREAST: ICD-10-CM

## 2025-02-25 DIAGNOSIS — J41.0 SMOKERS' COUGH (HCC): ICD-10-CM

## 2025-02-25 DIAGNOSIS — E78.2 MIXED HYPERLIPIDEMIA: ICD-10-CM

## 2025-02-25 DIAGNOSIS — I25.10 ATHEROSCLEROSIS OF NATIVE CORONARY ARTERY OF NATIVE HEART WITHOUT ANGINA PECTORIS: ICD-10-CM

## 2025-02-25 DIAGNOSIS — N20.0 KIDNEY STONES: ICD-10-CM

## 2025-02-25 DIAGNOSIS — R91.8 PULMONARY NODULES/LESIONS, MULTIPLE: ICD-10-CM

## 2025-02-25 PROBLEM — R91.1 PULMONARY NODULE: Status: ACTIVE | Noted: 2025-02-25

## 2025-02-25 NOTE — PATIENT INSTRUCTIONS
Continue all meds as prescribed    [place] [facility] [place] [facility] [place]    Followup with physical therapy and ortho as planned    Try the exercises for sciatica    Followup with me in 1 yr, sooner if needed

## 2025-02-25 NOTE — PROGRESS NOTES
Subjective:   Britt Erwin is a 69 year old female who presents for a MA (Medicare Advantage) Supervisit (Once per calendar year).     1. Encounter for annual health examination  2. Encounter for screening mammogram for malignant neoplasm of breast  3. Colon cancer screening  -due for wellness    4. Pulmonary nodule  -continues to be monitored by pulm - Dr. Abbasi  -last CT scan was 3 months ago    5. Mixed hyperlipidemia  6. Myalgia due to statin  CAD on imaging  -did not tolerate statin or red rice yeast  -tolerating zetia  -due for labs  -seeing cardiology    7. Kidney stones  -no new stones    8. Protein-calorie malnutrition, unspecified severity (HCC)  -weight better    9. Right hip pain  -seeing ortho and PT  -slightly improving        History/Other:   Fall Risk Assessment:   She has been screened for Falls and is low risk.      Cognitive Assessment:   She had a completely normal cognitive assessment - see flowsheet entries     Functional Ability/Status:   Britt Erwin has a completely normal functional assessment. See flowsheet for details.        Depression Screening (PHQ-2/PHQ-9): PHQ-2 SCORE: 0  , done 2/25/2025   Feeling bad about yourself - or that you are a failure or have let yourself or your family down: 1    If you checked off any problems, how difficult have these problems made it for you to do your work, take care of things at home, or get along with other people?: Not difficult at all    Last [place] Suicide Screening on 2/25/2025 was No Risk.     Advanced Directives:   She does NOT have a Living Will. [Do you have a living will?: (Patient-Rptd) No]  She does NOT have a Power of  for Health Care. [Do you have a healthcare power of ?: (Patient-Rptd) No]  Discussed Advance Care Planning with patient (and family/surrogate if present). Standard forms made available to patient in After Visit Summary.      Patient Active Problem List   Diagnosis    Closed fracture of one  or more phalanges of foot    Protein-calorie malnutrition, unspecified severity (HCC)    Myalgia due to statin    Facial paresthesia    Smokers' cough (HCC)    Personal history of colonic polyps    Visual changes    Pulmonary nodules/lesions, multiple    Atherosclerosis of native coronary artery of native heart without angina pectoris    Bilateral carotid artery stenosis    Mixed hyperlipidemia    Pulmonary nodule     Allergies:  She is allergic to nickel and statins.    Current Medications:  Outpatient Medications Marked as Taking for the 2/25/25 encounter (Office Visit) with Prasanth Baca MD   Medication Sig    ezetimibe 10 MG Oral Tab Take 1 tablet (10 mg total) by mouth daily.    fluticasone propionate 50 MCG/ACT Nasal Suspension USE 2 SPRAYS IN EACH NOSTRIL DAILY. SPRAY LATERALLY TO AVOID THE NASAL SEPTUM.    Metamucil Fiber Oral Chew Tab Chew by mouth. Gummy 1 per day    triamcinolone 0.1 % External Cream as needed.    aspirin 81 MG Oral Tab EC aspirin 81 mg tablet,delayed release, [RxNorm: 088702]    betamethasone dipropionate 0.05 % External Cream as needed.    Calcium Carb-Cholecalciferol (CALTRATE 600+D3 OR) Take 1 tablet by mouth daily.    metRONIDAZOLE 0.75 % External Cream Apply topically as needed.    Probiotic Product (PROBIOTIC-10 OR) Take by mouth.    Multiple Vitamins-Minerals (MULTIVITAL) Oral Chew Tab Chew by mouth.    mupirocin 2 % External Ointment Apply topically daily as needed.       Medical History:  She  has a past medical history of Allergic rhinitis (2000), Atypical mole, Body piercing, Calculus of kidney (2005), Carotid stenosis (2020), Decorative tattoo, Flatulence/gas pain/belching, skin cancer, basal cell (2016), Migraines (2022), Mondor's disease, Osteopenia, Visual impairment, and Wears glasses.  Surgical History:  She  has a past surgical history that includes colonoscopy (11/2013); colonoscopy (11/22/2013); devon localization wire 1 site left (cpt=19281) (1992 ? ); other  surgical history; other surgical history (); other surgical history (); other surgical history (); other surgical history; other surgical history (); skin surgery (Right, 2016); colonoscopy (2019); colonoscopy (N/A, 2019); correct bunion,othr methods (Left); removal of kidney stone (2022); cataract (Left, 01/10/2023); Bunionectomy (Right, 2022); Cataract extraction (Right, 2022); and egd.   Family History:  Her family history includes Cancer (age of onset: 64) in her sister; Cancer (age of onset: 75) in her mother; Colon Cancer in her self; Depression in her sister; Heart Attack in her father; Heart Disorder in her brother and father; Hypertension in her mother; Stroke in her father.  Social History:  She  reports that she quit smoking about 17 years ago. Her smoking use included cigarettes. She started smoking about 47 years ago. She has a 30 pack-year smoking history. She has been exposed to tobacco smoke. She has never used smokeless tobacco. She reports current alcohol use of about 3.0 standard drinks of alcohol per week. She reports that she does not use drugs.    Tobacco:  She smoked tobacco in the past but quit greater than 12 months ago.  Social History     Tobacco Use   Smoking Status Former    Current packs/day: 0.00    Average packs/day: 1 pack/day for 30.0 years (30.0 ttl pk-yrs)    Types: Cigarettes    Start date: 1977    Quit date: 2007    Years since quittin.2    Passive exposure: Past   Smokeless Tobacco Never          CAGE Alcohol Screen:   CAGE screening score of 0 on 2025, showing low risk of alcohol abuse.      Patient Care Team:  Prasanth Baca MD as PCP - General (Family Medicine)  Angel Gonazles MD as Consulting Physician (NEUROLOGY)    Review of Systems     Negative except above    Objective:   Physical Exam    General Appearance:  Alert, cooperative, no distress, appears stated age   Head:  Normocephalic, without obvious  abnormality, atraumatic   Eyes:  PERRL   Ears:  Normal TM's and external ear canals, both ears   Lungs:   Clear to auscultation bilaterally, respirations unlabored   Heart:  Regular rate and rhythm   Extremities: Extremities normal, no cyanosis or edema   Skin: Skin color, texture, turgor normal   Neurologic: No appreciable abnormality       /66 (BP Location: Left arm, Patient Position: Sitting, Cuff Size: adult)   Pulse 68   Temp 97.5 °F (36.4 °C) (Temporal)   Resp 16   Ht 5' 3\" (1.6 m)   Wt 118 lb 6.4 oz (53.7 kg)   LMP 12/11/2010   SpO2 99%   BMI 20.97 kg/m²  Estimated body mass index is 20.97 kg/m² as calculated from the following:    Height as of this encounter: 5' 3\" (1.6 m).    Weight as of this encounter: 118 lb 6.4 oz (53.7 kg).    Medicare Hearing Assessment:  Hearing Screening    Screening Method: Finger Rub  Finger Rub Result: Pass               Assessment & Plan:   Britt Erwin is a 69 year old female who presents for a Medicare Assessment.     1. Encounter for annual health examination  -reviewed age appropriate screening  -PAP due 2026  -colonoscopy due 2031  -mammo due now - ordered for future diagnostics    2. Encounter for screening mammogram for malignant neoplasm of breast  - [facility] 2D+3D SCREENING BILAT (CPT=77067/44401); Future    3. Colon cancer screening  -up to date    4. Pulmonary nodule  -continues to be monitored closely  -f/u with pulm as planned  -imaging has remained stable    CAD  5. Mixed hyperlipidemia  6. Myalgia due to statin  -stable on zetia  -had labs completed recently  -f/u with cardiology as planned    7. Kidney stones  -stable without recurrence - encouraged hydration    8. Protein-calorie malnutrition, unspecified severity (HCC)  -stable   -will continue to monitor    9. Right hip pain  -f/u with ortho and PT as planned       The patient indicates understanding of these issues and agrees to the plan.  Reinforced healthy diet, lifestyle, and  exercise.      Return in about 1 year (around 2/25/2026).     RAFFAELE CRUM MD, 2/10/2022     Supplementary Documentation:   General Health:  In the past six months, have you lost more than 10 pounds without trying?: (Patient-Rptd) 2 - No  Has your appetite been poor?: (Patient-Rptd) No  Type of Diet: (Patient-Rptd) Vegetarian;Low Carb  How does the patient maintain a good energy level?: (Patient-Rptd) Appropriate Exercise  How would you describe your daily physical activity?: (Patient-Rptd) Moderate  How would you describe your current health state?: (Patient-Rptd) Good  How do you maintain positive mental well-being?: (Patient-Rptd) Social Interaction;Puzzles;Games;Visiting Friends;Visiting Family  On a scale of 0 to 10, with 0 being no pain and 10 being severe pain, what is your pain level?: (Patient-Rptd) 1 - (Mild)  In the past six months, have you experienced urine leakage?: (Patient-Rptd) 0-No  At any time do you feel concerned for the safety/well-being of yourself and/or your children, in your home or elsewhere?: (Patient-Rptd) No  Have you had any immunizations at another office such as Influenza, Hepatitis B, Tetanus, or Pneumococcal?: (Patient-Rptd) Yes       Britt Erwin's SCREENING SCHEDULE   Tests on this list are recommended by your physician but may not be covered, or covered at this frequency, by your insurer.   Please check with your insurance carrier before scheduling to verify coverage.   PREVENTATIVE SERVICES FREQUENCY &  COVERAGE DETAILS LAST COMPLETION DATE   Diabetes Screening    Fasting Blood Sugar /  Glucose    One screening every 12 months if never tested or if previously tested but not diagnosed with pre-diabetes   One screening every 6 months if diagnosed with pre-diabetes Lab Results   Component Value Date    GLU 78 02/27/2024        Cardiovascular Disease Screening    Lipid Panel  Cholesterol  Lipoprotein (HDL)  Triglycerides Covered every 5 years for all Medicare beneficiaries  without apparent signs or symptoms of cardiovascular disease Lab Results   Component Value Date    CHOLEST 220 (H) 02/27/2024    HDL 95 02/27/2024     (H) 02/27/2024    LDLD 97 07/26/2022    TRIG 70 02/27/2024         Electrocardiogram (EKG)   Covered if needed at Welcome to Medicare, and non-screening if indicated for medical reasons 11/14/2023      Ultrasound Screening for Abdominal Aortic Aneurysm (AAA) Covered once in a lifetime for one of the following risk factors    Men who are 65-75 years old and have ever smoked    Anyone with a family history -     Colorectal Cancer Screening  Covered for ages 50-85; only need ONE of the following:    Colonoscopy   Covered every 10 years    Covered every 2 years if patient is at high risk or previous colonoscopy was abnormal 03/18/2024    Health Maintenance   Topic Date Due    Colorectal Cancer Screening  03/18/2031       Flexible Sigmoidoscopy   Covered every 4 years -    Fecal Occult Blood Test Covered annually -   Bone Density Screening    Bone density screening    Covered every 2 years after age 65 if diagnosed with risk of osteoporosis or estrogen deficiency.    Covered yearly for long-term glucocorticoid medication use (Steroids) Last Dexa Scan:    XR DEXA BONE DENSITOMETRY (CPT=77080) 03/03/2023      No recommendations at this time   Pap and Pelvic    Pap   Covered every 2 years for women at normal risk; Annually if at high risk 01/18/2021  Health Maintenance   Topic Date Due    Pap Smear  01/18/2026       Chlamydia Annually if high risk -  No recommendations at this time   Screening Mammogram    Mammogram     Recommend annually for all female patients aged 40 and older    One baseline mammogram covered for patients aged 35-39 03/09/2024    Health Maintenance   Topic Date Due    Mammogram  03/09/2025       Immunizations    Influenza Covered once per flu season  Please get every year 08/21/2024  No recommendations at this time    Pneumococcal Each vaccine  (Esfgcsw21 & Lxuzuxdxx32) covered once after 65 Prevnar 13: 01/18/2021    [id number]: 02/10/2022     No recommendations at this time    Hepatitis B One screening covered for patients with certain risk factors   -  No recommendations at this time    Tetanus Toxoid Not covered by Medicare Part B unless medically necessary (cut with metal); may be covered with your pharmacy prescription benefits -    Tetanus, Diptheria and Pertusis TD and TDaP Not covered by Medicare Part B -  No recommendations at this time    Zoster Not covered by Medicare Part B; may be covered with your pharmacy  prescription benefits -  No recommendations at this time     Chronic Obstructive Pulmonary Disease (COPD)    Spirometry Annually Spirometry date: 07/13/2024             Overall 60 minutes was spent on this encounter, 40 mins spent reviewing and providing care coordination for these conditions: nodule, lipid, stones, weight, hip pain  20  minutes was spent reviewing wellness elements and providing age appropriate screenings.

## 2025-03-04 ENCOUNTER — MED REC SCAN ONLY (OUTPATIENT)
Facility: CLINIC | Age: 70
End: 2025-03-04

## 2025-03-17 ENCOUNTER — HOSPITAL ENCOUNTER (OUTPATIENT)
Dept: MAMMOGRAPHY | Age: 70
Discharge: HOME | End: 2025-03-17
Attending: FAMILY MEDICINE
Payer: MEDICARE

## 2025-03-17 DIAGNOSIS — Z12.31 ENCOUNTER FOR SCREENING MAMMOGRAM FOR MALIGNANT NEOPLASM OF BREAST: ICD-10-CM

## 2025-03-17 PROCEDURE — 77063 BREAST TOMOSYNTHESIS BI: CPT | Performed by: FAMILY MEDICINE

## 2025-03-17 PROCEDURE — 77067 SCR MAMMO BI INCL CAD: CPT | Performed by: FAMILY MEDICINE

## (undated) DEVICE — GAUZE SPONGES,USP TYPE VII GAUZE, 12 PLY: Brand: CURITY

## (undated) DEVICE — OINTMENT BACITRACIN PKT

## (undated) DEVICE — ABDOMINAL PAD: Brand: DERMACEA

## (undated) DEVICE — GOWN,SIRUS,FABRIC-REINFORCED,LARGE: Brand: MEDLINE

## (undated) DEVICE — KENDALL SCD EXPRESS SLEEVES, KNEE LENGTH, MEDIUM: Brand: KENDALL SCD

## (undated) DEVICE — REM POLYHESIVE ADULT PATIENT RETURN ELECTRODE: Brand: VALLEYLAB

## (undated) DEVICE — UPPER EXTREMITY CDS-LF: Brand: MEDLINE INDUSTRIES, INC.

## (undated) DEVICE — SUPER SPONGES,MEDIUM: Brand: KERLIX

## (undated) DEVICE — PADDING CAST SOFT ROLL 2\" STER

## (undated) DEVICE — SUTURE ETHILON 3-0 PS-2

## (undated) DEVICE — SUTURE ETHILON 4-0 PS-2

## (undated) DEVICE — NON-ADHERENT STRIPS,OIL EMULSION: Brand: CURITY

## (undated) DEVICE — DISPOSABLE BIPOLAR FORCEPS 4" (10.2CM) JEWELERS, STRAIGHT 0.4MM TIP AND 12 FT. (3.6M) CABLE: Brand: KIRWAN

## (undated) DEVICE — GOWN SURG AERO CHROME XXL

## (undated) DEVICE — SOL  .9 1000ML BTL

## (undated) DEVICE — ZIMMER® STERILE DISPOSABLE TOURNIQUET CUFF WITH PLC, DUAL PORT, SINGLE BLADDER, 18 IN. (46 CM)

## (undated) DEVICE — STERILE POLYISOPRENE POWDER-FREE SURGICAL GLOVES: Brand: PROTEXIS

## (undated) NOTE — LETTER
January 25, 2018    Patient: Shan Juan   Date of Visit: 1/25/2018       To Whom It May Concern:    Kacie Recinos was seen and treated in our emergency department on 1/25/2018. She may return to work Monday 1/29/18.     If you have any question

## (undated) NOTE — LETTER
Date: 9/14/2021    Patient Name: Jb Villegas          To Whom it may concern: This letter has been written at the patient's request. The above patient was seen at the [facility] for treatment of a medical condition.     This patient s

## (undated) NOTE — LETTER
January 25, 2018    Patient: Saul Debra   Date of Visit: 1/25/2018       To Whom It May Concern:    Osito Holt was seen and treated in our emergency department on 1/25/2018. She should not return to work until 1/29.     If you have any quest

## (undated) NOTE — MR AVS SNAPSHOT
[facility] [address] [place] [address]  [address] [facility] [address] [place] [address] [phone]               Thank you for choosing us for your health care visit with JAGDEEP Sutherland. We are glad to serve you and happy to provide you with this summary of your visit.   Pl BP Pulse Temp Weight          110/58 mmHg 79 98.1 °F (36.7 °C) (Oral) 116 lb           Current Medications          This list is accurate as of: 3/11/17 12:50 PM.  Always use your most recent med list.                Albuterol Sulfate  (90 Base) MC

## (undated) NOTE — ED AVS SNAPSHOT
Chaitanya Singh   MRN: [id number]    Department:  [facility] in [place]   Date of Visit:  1/25/2018           Disclosure     Insurance plans vary and the physician(s) referred by the ER may not be covered by your plan.  Please cont tell this physician (or your personal doctor if your instructions are to return to your personal doctor) about any new or lasting problems. The primary care or specialist physician will see patients referred from the [facility] Emergency Department.  Erasmo Glasgow

## (undated) NOTE — ED AVS SNAPSHOT
Mark Hernandez   MRN: [id number]    Department:  [facility] Emergency Department   Date of Visit:  7/17/2018           Disclosure     Insurance plans vary and the physician(s) referred by the ER may not be covered by your plan.  Please contact y tell this physician (or your personal doctor if your instructions are to return to your personal doctor) about any new or lasting problems. The primary care or specialist physician will see patients referred from the [facility] Emergency Department.  Ysabel Pantoja

## (undated) NOTE — MR AVS SNAPSHOT
After Visit Summary   5/1/2024    Britt Erwin   MRN: [id number]           Visit Information     Date & Time  5/1/2024  9:00 AM Provider  [email or web address] Department  [facility] EEG Dept. Phone  [phone]      Your Vitals Were     LMP   12/11/2010             Allergies as of 5/1/2024  Review status set to Review Complete on 4/26/2024       Noted Reaction Type Reactions    Nickel 04/28/2023    SWELLING    Swelling rash on skin    Statins 07/18/2023    MYALGIA      Your Current Medications        Dosage    ipratropium 0.06 % Nasal Solution 1 spray by Nasal route in the morning and 1 spray before bedtime. 1 sprays in each nostril nightly.    fluticasone propionate 50 MCG/ACT Nasal Suspension USE 2 SPRAYS IN EACH NOSTRIL DAILY. SPRAY LATERALLY TO AVOID THE NASAL SEPTUM.    Metamucil Fiber Oral Chew Tab Chew by mouth. Gummy 1 per day    triamcinolone 0.1 % External Cream as needed.    aspirin 81 MG Oral Tab EC aspirin 81 mg tablet,delayed release, [RxNorm: 550705]    betamethasone dipropionate 0.05 % External Cream as needed.    Calcium Carb-Cholecalciferol (CALTRATE 600+D3 OR) Take 1 tablet by mouth daily.    metRONIDAZOLE 0.75 % External Cream Apply topically as needed.    Probiotic Product (PROBIOTIC-10 OR) Take by mouth.    Multiple Vitamins-Minerals (MULTIVITAL) Oral Chew Tab Chew by mouth.    mupirocin 2 % External Ointment Apply topically daily as needed.      Diagnoses for This Visit    Visual changes   [858182]             We Ordered the Following     Normal Orders This Visit    EEG (At [facility]) [NEU4 CUSTOM]       Future Appointments        Provider Department    7/31/2024 3:45 PM Vincent Abbasi [facility], [facility]    8/13/2024 9:00 AM Angel Gonzales [facility], [address] [place] [address]    2/25/2025 8:00 AM Prasanth Baca [facility], [facility]                Did you know that [facility] primary care  physicians now offer Video Visits through [employer] for adult patients for a variety of conditions such as allergies, back pain and cold symptoms? Skip the drive and waiting room and online chat with a doctor face-to-face using your web-cam enabled computer or mobile device wherever you are. Video Visits cost $50 and can be paid hassle-free using a credit, debit, or health savings card.  Not active on [employer]? Ask us how to get signed up today!          If you receive a survey from [employer], please take a few minutes to complete it and provide feedback. We strive to deliver the best patient experience and are looking for ways to make improvements. Your feedback will help us do so. For more information on [facility], please visit [email or web address]           No text in SmartText           No text in SmartText

## (undated) NOTE — LETTER
01/16/19        Slava Saldivar [address]      Dear Puneet Orellana,    [facility] records indicate that you have outstanding lab work and or testing that was ordered for you and has not yet been completed:        CBC W Differential W Platelet